# Patient Record
Sex: MALE | Employment: FULL TIME | URBAN - METROPOLITAN AREA
[De-identification: names, ages, dates, MRNs, and addresses within clinical notes are randomized per-mention and may not be internally consistent; named-entity substitution may affect disease eponyms.]

---

## 2017-03-15 PROBLEM — L20.9 ATOPIC DERMATITIS: Status: ACTIVE | Noted: 2017-03-15

## 2017-03-15 PROBLEM — B00.9 HERPES SIMPLEX TYPE 1 INFECTION: Status: ACTIVE | Noted: 2017-03-15

## 2017-03-15 PROBLEM — E78.5 HYPERLIPIDEMIA: Status: ACTIVE | Noted: 2017-03-15

## 2017-03-15 PROBLEM — L90.0 LICHEN SCLEROSUS ET ATROPHICUS: Status: ACTIVE | Noted: 2017-03-15

## 2017-03-23 PROBLEM — L72.3 SEBACEOUS CYST: Status: ACTIVE | Noted: 2017-03-23

## 2018-07-09 PROBLEM — Z80.42 FAMILY HISTORY OF PROSTATE CANCER IN FATHER: Status: ACTIVE | Noted: 2018-07-09

## 2018-07-20 ENCOUNTER — HOSPITAL ENCOUNTER (OUTPATIENT)
Age: 48
Discharge: HOME | End: 2018-07-22
Payer: COMMERCIAL

## 2018-07-20 ENCOUNTER — HOSPITAL ENCOUNTER (OUTPATIENT)
Dept: GENERAL RADIOLOGY | Age: 48
Discharge: HOME | End: 2018-07-22
Payer: COMMERCIAL

## 2018-07-20 DIAGNOSIS — R07.81 RIB PAIN ON RIGHT SIDE: ICD-10-CM

## 2018-07-20 PROCEDURE — 71101 X-RAY EXAM UNILAT RIBS/CHEST: CPT

## 2018-11-25 PROBLEM — Z28.21 REFUSED INFLUENZA VACCINE: Status: ACTIVE | Noted: 2018-11-25

## 2018-11-27 ENCOUNTER — HOSPITAL ENCOUNTER (OUTPATIENT)
Dept: GENERAL RADIOLOGY | Age: 48
Discharge: HOME | End: 2018-11-29
Payer: COMMERCIAL

## 2018-11-27 ENCOUNTER — HOSPITAL ENCOUNTER (OUTPATIENT)
Age: 48
Discharge: HOME | End: 2018-11-29
Payer: COMMERCIAL

## 2018-11-27 DIAGNOSIS — M25.572 ACUTE LEFT ANKLE PAIN: ICD-10-CM

## 2018-11-27 DIAGNOSIS — M79.672 FOOT PAIN, LEFT: ICD-10-CM

## 2018-11-27 PROCEDURE — 73630 X-RAY EXAM OF FOOT: CPT

## 2018-11-27 PROCEDURE — 73610 X-RAY EXAM OF ANKLE: CPT

## 2019-02-19 ENCOUNTER — OFFICE VISIT (OUTPATIENT)
Dept: PRIMARY CARE CLINIC | Age: 49
End: 2019-02-19
Payer: COMMERCIAL

## 2019-02-19 VITALS
SYSTOLIC BLOOD PRESSURE: 128 MMHG | TEMPERATURE: 98.2 F | DIASTOLIC BLOOD PRESSURE: 86 MMHG | BODY MASS INDEX: 42.09 KG/M2 | OXYGEN SATURATION: 98 % | HEART RATE: 84 BPM | WEIGHT: 285 LBS

## 2019-02-19 DIAGNOSIS — D18.01 CHERRY ANGIOMA: Primary | ICD-10-CM

## 2019-02-19 DIAGNOSIS — Z82.49 FAMILY HISTORY OF CEREBRAL ANEURYSM: ICD-10-CM

## 2019-02-19 DIAGNOSIS — D48.5 NEOPLASM OF UNCERTAIN BEHAVIOR OF SKIN: ICD-10-CM

## 2019-02-19 DIAGNOSIS — H92.02 EAR PAIN, LEFT: ICD-10-CM

## 2019-02-19 PROCEDURE — 99214 OFFICE O/P EST MOD 30 MIN: CPT | Performed by: PHYSICIAN ASSISTANT

## 2019-02-19 ASSESSMENT — PATIENT HEALTH QUESTIONNAIRE - PHQ9
SUM OF ALL RESPONSES TO PHQ9 QUESTIONS 1 & 2: 0
SUM OF ALL RESPONSES TO PHQ QUESTIONS 1-9: 0
1. LITTLE INTEREST OR PLEASURE IN DOING THINGS: 0
SUM OF ALL RESPONSES TO PHQ QUESTIONS 1-9: 0
2. FEELING DOWN, DEPRESSED OR HOPELESS: 0

## 2019-02-19 ASSESSMENT — ENCOUNTER SYMPTOMS: RESPIRATORY NEGATIVE: 1

## 2019-03-05 ENCOUNTER — TELEPHONE (OUTPATIENT)
Dept: PRIMARY CARE CLINIC | Age: 49
End: 2019-03-05

## 2019-03-15 ENCOUNTER — PROCEDURE VISIT (OUTPATIENT)
Dept: PRIMARY CARE CLINIC | Age: 49
End: 2019-03-15
Payer: COMMERCIAL

## 2019-03-15 VITALS
DIASTOLIC BLOOD PRESSURE: 80 MMHG | HEART RATE: 82 BPM | BODY MASS INDEX: 38.69 KG/M2 | WEIGHT: 262 LBS | OXYGEN SATURATION: 98 % | SYSTOLIC BLOOD PRESSURE: 122 MMHG

## 2019-03-15 DIAGNOSIS — D48.5 NEOPLASM OF UNCERTAIN BEHAVIOR OF SKIN: Primary | ICD-10-CM

## 2019-03-15 PROCEDURE — 11102 TANGNTL BX SKIN SINGLE LES: CPT | Performed by: PHYSICIAN ASSISTANT

## 2019-03-19 LAB — SURGICAL PATHOLOGY REPORT: NORMAL

## 2019-05-21 ENCOUNTER — OFFICE VISIT (OUTPATIENT)
Dept: PRIMARY CARE CLINIC | Age: 49
End: 2019-05-21
Payer: COMMERCIAL

## 2019-05-21 VITALS
BODY MASS INDEX: 41.32 KG/M2 | SYSTOLIC BLOOD PRESSURE: 118 MMHG | HEART RATE: 77 BPM | OXYGEN SATURATION: 98 % | DIASTOLIC BLOOD PRESSURE: 80 MMHG | WEIGHT: 279.8 LBS

## 2019-05-21 DIAGNOSIS — S86.112A GASTROCNEMIUS MUSCLE STRAIN, LEFT, INITIAL ENCOUNTER: ICD-10-CM

## 2019-05-21 PROCEDURE — 99213 OFFICE O/P EST LOW 20 MIN: CPT | Performed by: PHYSICIAN ASSISTANT

## 2019-05-21 ASSESSMENT — ENCOUNTER SYMPTOMS
BACK PAIN: 0
COLOR CHANGE: 0

## 2019-05-21 NOTE — PROGRESS NOTES
[facility]  [address] [facility]  Phone: [phone]  Fax: [phone]    Janeth Cutler is a 54 y.o. male who presents today for his medical conditions/complaintsas noted below. Chief Complaint   Patient presents with    Leg Pain     during sparring (martial arts) while trying to do a kick on Sunday, felt a pop in his left calf muscle. Now hurts with certain movements, walking is okay, not to painful. HPI:     HPI  Sparring at martial arts while doing a kick felt a pop in calf this past Sunday. Now in calf is swellling but no redness or warmth. Pushing off it. Motrin 800mg 1 po tid with food helps    Current Outpatient Medications   Medication Sig Dispense Refill    atorvastatin (LIPITOR) 10 MG tablet TAKE 1 TABLET BY MOUTH ONE TIME A DAY 30 tablet 11     No current facility-administered medications for this visit. No Known Allergies    Subjective:      Review of Systems   Musculoskeletal: Positive for gait problem and myalgias (left calf). Negative for arthralgias and back pain. Skin: Negative for color change, rash and wound. Objective:     /80   Pulse 77   Wt 279 lb 12.8 oz (126.9 kg)   SpO2 98%   BMI 41.32 kg/m²   Physical Exam   Musculoskeletal:        Left lower leg: He exhibits tenderness and swelling. He exhibits no bony tenderness, no edema, no deformity and no laceration. Legs:  Very tender here       Assessment:       Diagnosis Orders   1. Gastrocnemius muscle strain, left, initial encounter          Plan:    Continue Motrin 800mg 1 po tid with food  Heat and stretches  PT order given  D/W Dr. Moran High     Return if symptoms worsen or fail to improve. No orders of the defined types were placed in this encounter. No orders of the defined types were placed in this encounter.           Electronically signed by Kayleen Glaser 5/21/2019 at 1:55 PM

## 2019-07-18 ENCOUNTER — OFFICE VISIT (OUTPATIENT)
Dept: PRIMARY CARE CLINIC | Age: 49
End: 2019-07-18
Payer: COMMERCIAL

## 2019-07-18 VITALS
OXYGEN SATURATION: 97 % | SYSTOLIC BLOOD PRESSURE: 118 MMHG | HEART RATE: 66 BPM | WEIGHT: 277.6 LBS | BODY MASS INDEX: 40.99 KG/M2 | DIASTOLIC BLOOD PRESSURE: 86 MMHG

## 2019-07-18 DIAGNOSIS — M25.561 ACUTE PAIN OF RIGHT KNEE: ICD-10-CM

## 2019-07-18 PROCEDURE — 99213 OFFICE O/P EST LOW 20 MIN: CPT | Performed by: PHYSICIAN ASSISTANT

## 2019-09-19 ENCOUNTER — OFFICE VISIT (OUTPATIENT)
Dept: PRIMARY CARE CLINIC | Age: 49
End: 2019-09-19
Payer: COMMERCIAL

## 2019-09-19 VITALS
HEIGHT: 69 IN | DIASTOLIC BLOOD PRESSURE: 86 MMHG | BODY MASS INDEX: 41.35 KG/M2 | HEART RATE: 74 BPM | RESPIRATION RATE: 16 BRPM | SYSTOLIC BLOOD PRESSURE: 132 MMHG | WEIGHT: 279.2 LBS | OXYGEN SATURATION: 97 %

## 2019-09-19 DIAGNOSIS — S89.91XD INJURY OF RIGHT KNEE, SUBSEQUENT ENCOUNTER: Primary | ICD-10-CM

## 2019-09-19 DIAGNOSIS — M25.561 ACUTE PAIN OF RIGHT KNEE: ICD-10-CM

## 2019-09-19 PROCEDURE — 99213 OFFICE O/P EST LOW 20 MIN: CPT | Performed by: PHYSICIAN ASSISTANT

## 2019-09-19 ASSESSMENT — ENCOUNTER SYMPTOMS: COLOR CHANGE: 0

## 2019-09-19 NOTE — PROGRESS NOTES
 Diabetes screen  02/16/2012    Flu vaccine (1) 09/01/2019    Lipid screen  07/03/2023    DTaP/Tdap/Td vaccine (3 - Td) 03/23/2027    Pneumococcal 0-64 years Vaccine  Aged Out       Subjective:      Review of Systems   Musculoskeletal: Positive for arthralgias (knee), gait problem (when he moves certain ways he can't hiold his weight) and joint swelling. Negative for myalgias. Skin: Negative for color change, rash and wound. Objective:     /86   Pulse 74   Resp 16   Ht 5' 9\" (1.753 m)   Wt 279 lb 3.2 oz (126.6 kg)   SpO2 97%   BMI 41.23 kg/m²   Physical Exam   Musculoskeletal:        Right knee: He exhibits decreased range of motion (pain in flexion ), swelling (mild) and abnormal meniscus. He exhibits no effusion, no ecchymosis, no deformity, no laceration, no erythema, no LCL laxity, normal patellar mobility, no bony tenderness and no MCL laxity. Tenderness found. Medial joint line tenderness noted. Assessment:       Diagnosis Orders   1. Injury of right knee, subsequent encounter  MRI KNEE RIGHT WO CONTRAST   2. Acute pain of right knee          Plan:    Have MRI of knee  Continue ibuprofen   RICE principles. Return if symptoms worsen or fail to improve. Orders Placed This Encounter   Procedures    MRI KNEE RIGHT WO CONTRAST     Standing Status:   Future     Standing Expiration Date:   9/19/2020     No orders of the defined types were placed in this encounter. Patient given educationalmaterials - see patient instructions. Discussed use, benefit, and side effectsof prescribed medications. All patient questions answered. Pt voiced understanding. Reviewed health maintenance. Instructed to continue current medications, diet andexercise. Patient agreed with treatment plan. Follow up as directed.      Electronicallysigned by Delaney Christian PA-C on 9/19/2019 at 1:49 PM

## 2019-09-24 DIAGNOSIS — E78.5 HYPERLIPIDEMIA, UNSPECIFIED HYPERLIPIDEMIA TYPE: Primary | ICD-10-CM

## 2019-10-07 DIAGNOSIS — S89.91XD INJURY OF RIGHT KNEE, SUBSEQUENT ENCOUNTER: ICD-10-CM

## 2019-10-08 ENCOUNTER — TELEPHONE (OUTPATIENT)
Dept: PRIMARY CARE CLINIC | Age: 49
End: 2019-10-08

## 2019-10-08 DIAGNOSIS — M25.561 ACUTE PAIN OF RIGHT KNEE: ICD-10-CM

## 2019-10-08 DIAGNOSIS — M76.50 PATELLAR TENDINOSIS: Primary | ICD-10-CM

## 2019-10-10 ENCOUNTER — OFFICE VISIT (OUTPATIENT)
Dept: ORTHOPEDIC SURGERY | Age: 49
End: 2019-10-10
Payer: COMMERCIAL

## 2019-10-10 VITALS
DIASTOLIC BLOOD PRESSURE: 76 MMHG | HEIGHT: 70 IN | HEART RATE: 70 BPM | WEIGHT: 274 LBS | SYSTOLIC BLOOD PRESSURE: 118 MMHG | BODY MASS INDEX: 39.22 KG/M2

## 2019-10-10 DIAGNOSIS — S83.241A ACUTE MEDIAL MENISCUS TEAR OF RIGHT KNEE, INITIAL ENCOUNTER: Primary | ICD-10-CM

## 2019-10-10 PROCEDURE — 20611 DRAIN/INJ JOINT/BURSA W/US: CPT | Performed by: ORTHOPAEDIC SURGERY

## 2019-10-10 PROCEDURE — 99203 OFFICE O/P NEW LOW 30 MIN: CPT | Performed by: ORTHOPAEDIC SURGERY

## 2019-10-10 RX ORDER — LIDOCAINE HYDROCHLORIDE 10 MG/ML
4 INJECTION, SOLUTION INFILTRATION; PERINEURAL ONCE
Status: COMPLETED | OUTPATIENT
Start: 2019-10-10 | End: 2019-10-11

## 2019-10-10 RX ORDER — IBUPROFEN 200 MG
200 TABLET ORAL EVERY 6 HOURS PRN
COMMUNITY

## 2019-10-10 RX ORDER — METHYLPREDNISOLONE ACETATE 40 MG/ML
40 INJECTION, SUSPENSION INTRA-ARTICULAR; INTRALESIONAL; INTRAMUSCULAR; SOFT TISSUE ONCE
Status: COMPLETED | OUTPATIENT
Start: 2019-10-10 | End: 2019-10-11

## 2019-10-11 RX ADMIN — LIDOCAINE HYDROCHLORIDE 4 ML: 10 INJECTION, SOLUTION INFILTRATION; PERINEURAL at 09:16

## 2019-10-11 RX ADMIN — METHYLPREDNISOLONE ACETATE 40 MG: 40 INJECTION, SUSPENSION INTRA-ARTICULAR; INTRALESIONAL; INTRAMUSCULAR; SOFT TISSUE at 09:17

## 2019-11-21 ENCOUNTER — OFFICE VISIT (OUTPATIENT)
Dept: ORTHOPEDIC SURGERY | Age: 49
End: 2019-11-21
Payer: COMMERCIAL

## 2019-11-21 VITALS
HEART RATE: 84 BPM | WEIGHT: 274 LBS | BODY MASS INDEX: 40.58 KG/M2 | DIASTOLIC BLOOD PRESSURE: 86 MMHG | HEIGHT: 69 IN | SYSTOLIC BLOOD PRESSURE: 153 MMHG

## 2019-11-21 DIAGNOSIS — S83.241D ACUTE MEDIAL MENISCUS TEAR OF RIGHT KNEE, SUBSEQUENT ENCOUNTER: Primary | ICD-10-CM

## 2019-11-21 PROCEDURE — 99213 OFFICE O/P EST LOW 20 MIN: CPT | Performed by: ORTHOPAEDIC SURGERY

## 2019-11-21 ASSESSMENT — ENCOUNTER SYMPTOMS
VOMITING: 0
ABDOMINAL DISTENTION: 0
COLOR CHANGE: 0
ABDOMINAL PAIN: 0
CHEST TIGHTNESS: 0
CONSTIPATION: 0
APNEA: 0
DIARRHEA: 0
COUGH: 0
SHORTNESS OF BREATH: 0
NAUSEA: 0

## 2019-12-10 ENCOUNTER — OFFICE VISIT (OUTPATIENT)
Dept: PRIMARY CARE CLINIC | Age: 49
End: 2019-12-10
Payer: COMMERCIAL

## 2019-12-10 VITALS
WEIGHT: 293 LBS | OXYGEN SATURATION: 98 % | SYSTOLIC BLOOD PRESSURE: 138 MMHG | HEART RATE: 89 BPM | HEIGHT: 69 IN | BODY MASS INDEX: 43.4 KG/M2 | DIASTOLIC BLOOD PRESSURE: 82 MMHG

## 2019-12-10 DIAGNOSIS — S69.92XA HAND INJURY, LEFT, INITIAL ENCOUNTER: Primary | ICD-10-CM

## 2019-12-10 DIAGNOSIS — Z13.1 SCREENING FOR DIABETES MELLITUS: ICD-10-CM

## 2019-12-10 PROCEDURE — 99213 OFFICE O/P EST LOW 20 MIN: CPT | Performed by: PHYSICIAN ASSISTANT

## 2019-12-11 ENCOUNTER — TELEPHONE (OUTPATIENT)
Dept: PRIMARY CARE CLINIC | Age: 49
End: 2019-12-11

## 2019-12-11 ENCOUNTER — HOSPITAL ENCOUNTER (OUTPATIENT)
Dept: GENERAL RADIOLOGY | Age: 49
Discharge: HOME | End: 2019-12-13
Payer: COMMERCIAL

## 2019-12-11 ENCOUNTER — HOSPITAL ENCOUNTER (OUTPATIENT)
Age: 49
Discharge: HOME | End: 2019-12-13
Payer: COMMERCIAL

## 2019-12-11 DIAGNOSIS — S69.92XA HAND INJURY, LEFT, INITIAL ENCOUNTER: ICD-10-CM

## 2019-12-11 PROCEDURE — 73130 X-RAY EXAM OF HAND: CPT

## 2019-12-23 ENCOUNTER — TELEPHONE (OUTPATIENT)
Dept: ORTHOPEDIC SURGERY | Age: 49
End: 2019-12-23

## 2019-12-27 ENCOUNTER — HOSPITAL ENCOUNTER (OUTPATIENT)
Age: 49
Setting detail: SPECIMEN
Discharge: HOME | End: 2019-12-27
Payer: COMMERCIAL

## 2019-12-27 DIAGNOSIS — Z13.1 SCREENING FOR DIABETES MELLITUS: ICD-10-CM

## 2019-12-27 DIAGNOSIS — E78.5 HYPERLIPIDEMIA, UNSPECIFIED HYPERLIPIDEMIA TYPE: ICD-10-CM

## 2019-12-27 LAB
ALT SERPL-CCNC: 32 U/L (ref 5–41)
ANION GAP SERPL CALCULATED.3IONS-SCNC: 12 MMOL/L (ref 9–17)
AST SERPL-CCNC: 21 U/L
BUN BLDV-MCNC: 16 MG/DL (ref 6–20)
BUN/CREAT BLD: ABNORMAL (ref 9–20)
CALCIUM SERPL-MCNC: 9.2 MG/DL (ref 8.6–10.4)
CHLORIDE BLD-SCNC: 102 MMOL/L (ref 98–107)
CHOLESTEROL/HDL RATIO: 4.2
CHOLESTEROL: 193 MG/DL
CO2: 28 MMOL/L (ref 20–31)
CREAT SERPL-MCNC: 1 MG/DL (ref 0.7–1.2)
GFR AFRICAN AMERICAN: >60 ML/MIN
GFR NON-AFRICAN AMERICAN: >60 ML/MIN
GFR SERPL CREATININE-BSD FRML MDRD: ABNORMAL ML/MIN/{1.73_M2}
GFR SERPL CREATININE-BSD FRML MDRD: ABNORMAL ML/MIN/{1.73_M2}
GLUCOSE FASTING: 109 MG/DL (ref 70–99)
HDLC SERPL-MCNC: 46 MG/DL
LDL CHOLESTEROL: 120 MG/DL (ref 0–130)
POTASSIUM SERPL-SCNC: 4.3 MMOL/L (ref 3.7–5.3)
SODIUM BLD-SCNC: 142 MMOL/L (ref 135–144)
TRIGL SERPL-MCNC: 134 MG/DL
VLDLC SERPL CALC-MCNC: NORMAL MG/DL (ref 1–30)

## 2020-01-13 ENCOUNTER — OFFICE VISIT (OUTPATIENT)
Dept: ORTHOPEDIC SURGERY | Age: 50
End: 2020-01-13
Payer: COMMERCIAL

## 2020-01-13 ENCOUNTER — HOSPITAL ENCOUNTER (OUTPATIENT)
Age: 50
Setting detail: SPECIMEN
Discharge: HOME | End: 2020-01-13
Payer: COMMERCIAL

## 2020-01-13 VITALS
BODY MASS INDEX: 41.09 KG/M2 | WEIGHT: 287 LBS | HEART RATE: 63 BPM | HEIGHT: 70 IN | SYSTOLIC BLOOD PRESSURE: 150 MMHG | DIASTOLIC BLOOD PRESSURE: 98 MMHG

## 2020-01-13 LAB
CULTURE: NORMAL
DIRECT EXAM: NORMAL
Lab: NORMAL
SPECIMEN DESCRIPTION: NORMAL

## 2020-01-13 PROCEDURE — 99213 OFFICE O/P EST LOW 20 MIN: CPT | Performed by: ORTHOPAEDIC SURGERY

## 2020-01-13 PROCEDURE — 20611 DRAIN/INJ JOINT/BURSA W/US: CPT | Performed by: ORTHOPAEDIC SURGERY

## 2020-01-13 ASSESSMENT — ENCOUNTER SYMPTOMS
COUGH: 0
APNEA: 0
CHEST TIGHTNESS: 0
VOMITING: 0
CONSTIPATION: 0
ABDOMINAL PAIN: 0
COLOR CHANGE: 0
DIARRHEA: 0
NAUSEA: 0
SHORTNESS OF BREATH: 0
ABDOMINAL DISTENTION: 0

## 2020-01-13 NOTE — PROGRESS NOTES
[address] [facility]  [address] [place] [id number]  Dept: [phone]  Dept Fax: [phone]          Right Knee - Follow Up     Subjective:     Chief Complaint   Patient presents with    Knee Pain     Patient is here today to follow up on his right knee pain. He states that he bowled last night, and was sore, but this morning he is not in any pain. HPI:     Rogelio Islas presents today for Right knee pain. The pain has been present for 6 months. The patient recalls a specific injury where the knee got twisted in June 2019 and he has had pain since. Patient states that his knee was feeling good but a few weeks ago the knee pain became unbearable and he was unable to walk. He states that the pain became so bad that his boss asked him when he was going to get the knee fixed. That is what made him setup an appointment. The patient has tried rest, heat, stretching and ice with mild improvement. The pain is now described as Achy. There is pain on weight bearing. The knee has not swelled. There is painful popping and clicking. The knee has not caught or locked up. The knee has not given out. It is not stiff upon arising from sitting. It is not painful to go up and down stairs and sit for a prolonged time. The patient has had a cortisone injection on 10/10/2019 with good pain relief. The patient has not tried a lubrication injection. The patient has not tried physical therapy but he does home exercises. The patient has not had surgery. The opposite knee is okay. Patient states that he went bowling last night and his knee was a little sore but the pain is bearable and it is not as bad as it was weeks ago. Review of Systems   Constitutional: Positive for activity change. Negative for appetite change. Respiratory: Negative for apnea, cough, chest tightness and shortness of breath.     Cardiovascular: Negative for chest pain, Attends Worship service: None     Active member of club or organization: None     Attends meetings of clubs or organizations: None     Relationship status: None    Intimate partner violence:     Fear of current or ex partner: None     Emotionally abused: None     Physically abused: None     Forced sexual activity: None   Other Topics Concern    None   Social History Narrative    None     Family History:  Family History   Problem Relation Age of Onset    Other Mother         cerebral aneurysm    Prostate Cancer Father     Kidney Disease Father     Coronary Art Dis Father     Breast Cancer Paternal Grandmother     Other Paternal Grandfather         ALS    Diabetes Sister         Type 2     Vitals:   BP (!) 150/98   Pulse 63   Ht 5' 10\" (1.778 m)   Wt 287 lb (130.2 kg)   BMI 41.18 kg/m²  Body mass index is 41.18 kg/m². Physical Examination:     Orthopedics:    GENERAL: Alert and oriented X3 in no acute distress. SKIN: Intact without lesions or ulcerations. NEURO: Musculoskeletal and axillary nerves intact to sensory and motor testing. VASC: Capillary refill is less than 3 seconds. KNEE EXAM    LOCATION: Right Knee  GEN: Alert and oriented X 3, in no acute distress. GAIT: The patient's gait was observed while entering the exam room and was noted to be non antalgic. The extremity is in slight varus alignment. SKIN: Intact without rashes, lesions, or ulcerations. No obvious deformity or swelling. NEURO: The patient responds to light touch throughout right LE. Patellar and Achilles reflexes are 2/4. VASC: The right LE is neurovascularly intact with 2/4 DP and 2/4 PT pulses. Brisk capillary refill. ROM: 0/132 degrees. There is mild effusion. Pain with forced extension. MUSC: Good quad tone  LIGAMENT: Lachman's test is Negative with Good endpoint. Anterior drawer Negative. Posterior drawer Negative. There is  No varus instability at 0 degrees and No varus instability at 30 degrees.  There is No valgus instability at 0 degrees and No valgus instability at 30 degrees. SPECIAL: Christiano test is positive with no clunks and no crepitation but there is mild pain. PALP: There is medial joint line pain. Assessment:     1. Acute medial meniscus tear of right knee, initial encounter      Procedures:    Procedure: yes    Arthrocentesis    Location: Right Knee  Procedure: After consent was obtained, using sterile technique the right knee was prepped and plain Lidocaine 1% was used as local anesthetic. The joint was entered and 17 cc's of yellow colored fluid was withdrawn and sent to check for gout. The procedure was well tolerated. The patient is asked to continue to rest the joint for a few more days before resuming regular activities. It may be more painful for the first 1-2 days. Watch for fever, or increased swelling or persistent pain in the joint. Call or return to clinic prn if such symptoms occur or there is failure to improve as anticipated. --------------------------------------------------------------------------------------------------------------------  Regular Knee Injection    Location: Right Knee  Procedure: Corticosteroid injection into the knee. The patient was placed in the Supine position on the exam table. The superior lateral portal was identified and marked. The skin was prepped with betadine in a sterile fashion. Utilizing ultrasound for precise placement and clean technique with sterile gloves a 5cc solution containing 4cc of 1.0% Lidocaine with 1cc containing 40mg of Depo-medrol  was injected. There was no resistance to the injection. The wound was cleansed and a band-aid was placed. the patient tolerated the procedure without difficulty. Adverse reactions to the injection were discussed with the patient including signs of infection (increasing pain, redness, swelling) and the patient was instructed to call immediately if experiencing any of these symptoms.    Radiology:   X-ray was reviewed from 11/21/2019. MRI was reviewed from 10/02/2019. Plan:   Treatment : I reviewed the X-ray and MRI with the patient and I informed him that I do not see any severe osteoarthritis or any fractures. We discussed the etiologies and natural histories of Effusion and Tear of the medial meniscus in the in the right knee. We discussed the various treatment alternatives including anti-inflammatory medications, physical therapy, injections, further imaging studies and as a last result surgery. During today's visit, I explained to the patient that there is nothing that I can see on the radiographs or the MRI that is causing his pain. I then informed him that we can try aspirating the knee and sending it to a lab to check for gout and then we can inject the knee with cortisone today to help him get good pain relief. But overall I do not feel he needs a surgery and I made the patient aware of that. I then asked the patient if he would like to have the knee aspirate to check for gout to see if the gout is what is causing his pain and he stated that he would like to try it today. The patient has opted for arthrocentesis, where we removed 17 cc's of yellow tinged synovial fluid from the right knee joint and the fluid was sent to check for gout. Patient also opted for a cortisone injection into the right knee to help reduce inflammation and pain. The injection site should never get red, hot, or swollen and if it does the patient will contact our office right away. The patient may experience a increase in soreness the first 24-48 hours due to a cortisone flair and can take anti-inflammatories for a short period of time to reduce that soreness. The patient should not submerge the injection site in water for a minimum of 24 hours to avoid infection. This means no lakes, pools, ponds, or hot tubs for 24 hours. If the patient is diabetic the injection may increase their blood sugar for up to one week.  The patient can do this cortisone injection once every 3 months as needed. If the injections stop working and do not give the patient relief the patient should consider surgical interventions to produce long term relief. Then if the cortisone injection and aspiration does not help for the pain, I informed him that we can try physical therapy, we can try a medial  brace and we can also have him try NSAID's at a prescription dose to help reduce his pain. Patient states that he understands. Patient should return to the clinic in 6 weeks to follow up with Carli SANCHEZ and at that visit we will refer if he has gout based on his lab results. The patient will call the office immediately with any problems. Orders Placed This Encounter   Medications    lidocaine 1 % injection 4 mL    methylPREDNISolone acetate (DEPO-MEDROL) injection 40 mg     Orders Placed This Encounter   Procedures    Anaerobic and Aerobic Culture     MUST HOLD CULTURES FOR 14 DAYS (even if no growth) right knee     Standing Status:   Future     Standing Expiration Date:   1/13/2021    Gram Stain     Right knee     Standing Status:   Future     Standing Expiration Date:   1/13/2021     Order Specific Question:   Specify Specimen Type     Answer:   Fluid    Body Fluid Cell Count with Differential     Right knee     Standing Status:   Future     Standing Expiration Date:   1/13/2021     Order Specific Question:   Specify Specimen Type     Answer:   Fluid    Body Fluid Crystal     Right knee     Standing Status:   Future     Standing Expiration Date:   1/13/2021     Adrian Mendez V am scribing for and in the presence of Carli Jaramillo D.O. 1/18/2020  9:03 PM        I, Carli Jaramillo DO, have personally seen this patient and I have reviewed the CC, PMH, FHX and Social History as provided by other clinical staff. I reassessed the HPI and ROS as scribed by Elidia Chance in my presence and it is both accurate and complete.  Thereafter, I personally performed

## 2020-01-14 LAB
APPEARANCE FLUID: NORMAL
BASO FLUID: NORMAL %
COLOR FLUID: NORMAL
CRYSTALS, FLUID: NEGATIVE
EOSINOPHIL FLUID: NORMAL %
FLUID DIFF COMMENT: NORMAL
LYMPHOCYTES, BODY FLUID: 86 %
MONOCYTE, FLUID: NORMAL %
NEUTROPHIL, FLUID: 7 %
OTHER CELLS FLUID: NORMAL %
RBC FLUID: NORMAL /MM3
SPECIMEN TYPE: NORMAL
SPECIMEN TYPE: NORMAL
WBC FLUID: 375 /MM3

## 2020-01-15 RX ORDER — LIDOCAINE HYDROCHLORIDE 10 MG/ML
4 INJECTION, SOLUTION INFILTRATION; PERINEURAL ONCE
Status: COMPLETED | OUTPATIENT
Start: 2020-01-15 | End: 2020-01-17

## 2020-01-15 RX ORDER — METHYLPREDNISOLONE ACETATE 40 MG/ML
40 INJECTION, SUSPENSION INTRA-ARTICULAR; INTRALESIONAL; INTRAMUSCULAR; SOFT TISSUE ONCE
Status: COMPLETED | OUTPATIENT
Start: 2020-01-15 | End: 2020-01-17

## 2020-01-17 RX ADMIN — METHYLPREDNISOLONE ACETATE 40 MG: 40 INJECTION, SUSPENSION INTRA-ARTICULAR; INTRALESIONAL; INTRAMUSCULAR; SOFT TISSUE at 13:04

## 2020-01-17 RX ADMIN — LIDOCAINE HYDROCHLORIDE 4 ML: 10 INJECTION, SOLUTION INFILTRATION; PERINEURAL at 13:02

## 2020-01-19 LAB
CULTURE: ABNORMAL
DIRECT EXAM: ABNORMAL
Lab: ABNORMAL
SPECIMEN DESCRIPTION: ABNORMAL

## 2020-02-27 ENCOUNTER — OFFICE VISIT (OUTPATIENT)
Dept: ORTHOPEDIC SURGERY | Age: 50
End: 2020-02-27
Payer: COMMERCIAL

## 2020-02-27 VITALS — BODY MASS INDEX: 41.09 KG/M2 | HEIGHT: 70 IN | WEIGHT: 287.04 LBS

## 2020-02-27 PROCEDURE — 99213 OFFICE O/P EST LOW 20 MIN: CPT | Performed by: ORTHOPAEDIC SURGERY

## 2020-02-27 ASSESSMENT — ENCOUNTER SYMPTOMS
ABDOMINAL DISTENTION: 0
CONSTIPATION: 0
CHEST TIGHTNESS: 0
APNEA: 0
NAUSEA: 0
VOMITING: 0
COUGH: 0
COLOR CHANGE: 0
SHORTNESS OF BREATH: 0
ABDOMINAL PAIN: 0
DIARRHEA: 0

## 2020-02-27 NOTE — PROGRESS NOTES
[address] [facility]  [address] [place] [address]  Dept: [phone]  Dept Fax: [phone]          Right Knee - Follow Up     Subjective:     Chief Complaint   Patient presents with    Knee Pain     RT Knee pain,cortisone inj 01/13     HPI:     Leslee Malin presents today for Right knee pain. The pain has been present for 7 months. The patient recalls a specific injury where the knee got twisted in June 2019 and he has had pain since. The patient has tried Ibuprofen, rest, heat, stretching and ice with moderate improvement. The pain is now described as Stabbing , Achy, Sharp and Dull based on what he is doing. There is pain on weight bearing. The knee has swelled. There is not painful popping and clicking. The knee has not caught or locked up. The knee has not given out. It is not stiff upon arising from sitting. It is painful to go up and down stairs and sit for a prolonged time. The patient has had a cortisone injection and arthrocentesis on 01/13/2020 and he states that he had good pain relief. The patient has not tried a lubrication injection. The patient has not tried physical therapy but he does home exercises. The patient has not had surgery. The opposite knee is okay. Patient states that he has stopped doing martial arts due to his pain but he states the knee has gotten better recently. ROS:   Review of Systems   Constitutional: Positive for activity change. Negative for appetite change. Respiratory: Negative for apnea, cough, chest tightness and shortness of breath. Cardiovascular: Negative for chest pain, palpitations and leg swelling. Gastrointestinal: Negative for abdominal distention, abdominal pain, constipation, diarrhea, nausea and vomiting. Genitourinary: Negative for difficulty urinating, dysuria and hematuria. Musculoskeletal: Positive for arthralgias.  Negative for gait problem, joint swelling and myalgias. Skin: Negative for color change and rash. Neurological: Negative for dizziness, weakness, numbness and headaches. Psychiatric/Behavioral: Negative for sleep disturbance. Past Medical History:    Past Medical History:   Diagnosis Date    Allergic rhinitis     Sciatica     Varicocele      Past Surgical History:    Past Surgical History:   Procedure Laterality Date    WISDOM TOOTH EXTRACTION       Current Medications:   Current Outpatient Medications   Medication Sig Dispense Refill    ibuprofen (ADVIL;MOTRIN) 200 MG tablet Take 200 mg by mouth every 6 hours as needed for Pain      atorvastatin (LIPITOR) 10 MG tablet TAKE 1 TABLET BY MOUTH ONE TIME A DAY  90 tablet 3     No current facility-administered medications for this visit. Allergies:    Patient has no known allergies.     Social History:   Social History     Socioeconomic History    Marital status:      Spouse name: None    Number of children: None    Years of education: None    Highest education level: None   Occupational History    None   Social Needs    Financial resource strain: None    Food insecurity:     Worry: None     Inability: None    Transportation needs:     Medical: None     Non-medical: None   Tobacco Use    Smoking status: Never Smoker    Smokeless tobacco: Never Used   Substance and Sexual Activity    Alcohol use: Yes     Comment: \"once in a blue moon\"    Drug use: No    Sexual activity: None   Lifestyle    Physical activity:     Days per week: None     Minutes per session: None    Stress: None   Relationships    Social connections:     Talks on phone: None     Gets together: None     Attends Jain service: None     Active member of club or organization: None     Attends meetings of clubs or organizations: None     Relationship status: None    Intimate partner violence:     Fear of current or ex partner: None     Emotionally abused: None     Physically abused: None     Forced sexual activity: None   Other Topics Concern    None   Social History Narrative    None     Family History:  Family History   Problem Relation Age of Onset    Other Mother         cerebral aneurysm    Prostate Cancer Father     Kidney Disease Father     Coronary Art Dis Father     Breast Cancer Paternal Grandmother     Other Paternal Grandfather         ALS    Diabetes Sister         Type 2     Vitals:   Ht 5' 10\" (1.778 m)   Wt 287 lb 0.6 oz (130.2 kg)   BMI 41.19 kg/m²  Body mass index is 41.19 kg/m². Physical Examination:     Orthopedics:    GENERAL: Alert and oriented X3 in no acute distress. SKIN: Intact without lesions or ulcerations. NEURO: Musculoskeletal and axillary nerves intact to sensory and motor testing. VASC: Capillary refill is less than 3 seconds. KNEE EXAM    LOCATION: Right Knee  GEN: Alert and oriented X 3, in no acute distress. GAIT: The patient's gait was observed while entering the exam room and was noted to be non  antalgic. The extremity is in anatomic alignment. SKIN: Intact without rashes, lesions, or ulcerations. No obvious deformity or swelling. NEURO: The patient responds to light touch throughout right LE. Patellar and Achilles reflexes are 2/4. VASC: The right LE is neurovascularly intact with 2/4 DP and 2/4 PT pulses. Brisk capillary refill. ROM: 0/134 degrees. There is mild effusion. No pain with forced extension or flexion. MUSC: good quad tone  LIGAMENT: Lachman's test is Negative with Good endpoint. Anterior drawer Negative. Posterior drawer Negative. There is  No varus instability at 0 degrees and No varus instability at 30 degrees. There is No valgus instability at 0 degrees and No valgus instability at 30 degrees. SPECIAL: Christiano test is negative with no clunks, no crepitation, and no pain. PALP: There is medial joint line pain and medial tibial plateau pain. Assessment:     1. Acute medial meniscus tear of right knee, subsequent encounter    2. Effusion of bursa of right knee    3. Primary osteoarthritis of right knee      Procedures:    Procedure: no  Radiology:   X-ray was reviewed from 11/21/2019. Mri was reviewed from 10/02/2019. Plan:   Treatment : I reviewed the X-ray and MRI with the patient. We discussed the etiologies and natural histories of Effusion, tear of the medial meniscus and primary osteoarthritis of the right knee. We discussed the various treatment alternatives including anti-inflammatory medications, physical therapy, injections, further imaging studies and as a last result surgery. During today's visit, I explained and instructed the patient to start slow when he is trying to get back into doing the things he likes to do. From there, I informed him that we can ask his insurance for a lubricating injection to treat his arthritis that he has in his knee. I also told him that we can try a brace to help take pressure off of his knee. Lastly, I informed him that he may live with the pain but I do not feel that it is the best option for him. So I asked the patient what he would like to do and he stated that he would like to try the lubricating injection and a brace to help his knee get good pain relief. I also told him that he should try using an exercise bike at a local gym to help him strengthen his knee and to help reduce some of the pain that he is having. So at this time, the patient has opted for a medial  brace and we will also ask his insurance to approve a lubricating injection. A medial  brace was ordered for the patient to provide pain control and good joint stabilization of the right knee. The brace will improve the ADL's for the patient so they may have a better quality of life and it will reduce the pain they are experiencing. Patient was also instructed to contact the office in 2 weeks if he has not heard from the office about his lubricating injection being approved.  Patient should return to the clinic in 4 weeks to follow up with Jackson Meza PA-C, [facility] for a lubricating injection once it is approved. The patient will call the office immediately with any problems. No orders of the defined types were placed in this encounter. Orders Placed This Encounter   Procedures    DME Order for (Specify) as OP     - DME device ordered - medial  brace for right knee  - Diagnosis: right knee arthritis  - Length of Need: 3 years      Ambar MOBLEY Day V, am scribing for and in the presence of Tatiana Bolaños D.O. 2/29/2020  11:02 PM        ITatiana DO, have personally seen this patient and I have reviewed the CC, PMH, FHX and Social History as provided by other clinical staff. I reassessed the HPI and ROS as scribed by Richardson Cooks in my presence and it is both accurate and complete. Thereafter, I personally performed the PE, reviewed the imaging and established the DX and POC. I agree with the documentation provided by the Medical Scribe. I have reviewed all documentation in its entirety prior to providing my signature indicating agreement. Any areas of disagreement are noted on the chart.     Electronically signed by Coty Kelly DO on 2/29/2020 at 11:02 PM          Electronically signed by Coty Kelly DO, on 2/29/2020 at 11:02 PM

## 2020-03-05 ENCOUNTER — TELEPHONE (OUTPATIENT)
Dept: ORTHOPEDIC SURGERY | Age: 50
End: 2020-03-05

## 2020-03-05 NOTE — TELEPHONE ENCOUNTER
Spoke with Asuncion Sandoval at [facility], No PA needed for Synvics one inj.  Patient will call when he decides if he wants inj

## 2020-05-29 ENCOUNTER — OFFICE VISIT (OUTPATIENT)
Dept: ORTHOPEDIC SURGERY | Age: 50
End: 2020-05-29
Payer: COMMERCIAL

## 2020-05-29 VITALS — HEIGHT: 70 IN | BODY MASS INDEX: 41.09 KG/M2 | WEIGHT: 287.04 LBS | TEMPERATURE: 97.7 F

## 2020-05-29 PROCEDURE — 20611 DRAIN/INJ JOINT/BURSA W/US: CPT | Performed by: PHYSICIAN ASSISTANT

## 2020-05-29 PROCEDURE — 99213 OFFICE O/P EST LOW 20 MIN: CPT | Performed by: PHYSICIAN ASSISTANT

## 2020-05-29 RX ORDER — LIDOCAINE HYDROCHLORIDE 10 MG/ML
4 INJECTION, SOLUTION INFILTRATION; PERINEURAL ONCE
Status: COMPLETED | OUTPATIENT
Start: 2020-05-29 | End: 2020-05-29

## 2020-05-29 RX ORDER — METHYLPREDNISOLONE ACETATE 40 MG/ML
40 INJECTION, SUSPENSION INTRA-ARTICULAR; INTRALESIONAL; INTRAMUSCULAR; SOFT TISSUE ONCE
Status: COMPLETED | OUTPATIENT
Start: 2020-05-29 | End: 2020-05-29

## 2020-05-29 RX ADMIN — METHYLPREDNISOLONE ACETATE 40 MG: 40 INJECTION, SUSPENSION INTRA-ARTICULAR; INTRALESIONAL; INTRAMUSCULAR; SOFT TISSUE at 13:11

## 2020-05-29 RX ADMIN — LIDOCAINE HYDROCHLORIDE 4 ML: 10 INJECTION, SOLUTION INFILTRATION; PERINEURAL at 13:11

## 2020-05-29 ASSESSMENT — ENCOUNTER SYMPTOMS
CHEST TIGHTNESS: 0
CONSTIPATION: 0
DIARRHEA: 0
ABDOMINAL DISTENTION: 0
SHORTNESS OF BREATH: 0
VOMITING: 0
COLOR CHANGE: 0
APNEA: 0
COUGH: 0
NAUSEA: 0
ABDOMINAL PAIN: 0

## 2020-05-29 NOTE — PROGRESS NOTES
instability at 30 degrees. SPECIAL: Christiano test is negative with no clunks, no crepitation, and no pain. PALP: There is medial joint line pain. Assessment:     1. Primary osteoarthritis of right knee    2. Plantar fasciitis of left foot      Procedures:    Procedure: yes    Arthrocentesis    Location: Right Knee  Procedure: After consent was obtained, using sterile technique the right kneee was prepped and plain Lidocaine 1% was used as local anesthetic. The joint was entered and 20 cc's of yellow colored fluid was withdrawn. 1 cc of 40 mg Depo-medrol and 4 cc's of plain Lidocaine was then injected and the needle withdrawn. The procedure was well tolerated. The patient is asked to continue to rest the joint for a few more days before resuming regular activities. It may be more painful for the first 1-2 days. Watch for fever, or increased swelling or persistent pain in the joint. Call or return to clinic prn if such symptoms occur or there is failure to improve as anticipated. --------------------------------------------------------------------------------------------------------------------  Regular Knee Injection    Location: Right Knee  Procedure: Corticosteroid injection into the knee. The patient was placed in the Supine position on the exam table. The superior lateral portal was identified and marked. The skin was prepped with betadine in a sterile fashion. Utilizing ultrasound for precise placement and clean technique with sterile gloves a 5 cc solution containing 4cc of 1.0% Lidocaine with 1cc containing 40mg of Depo-medrol  was injected. There was no resistance to the injection. The wound was cleansed and a band-aid was placed. the patient tolerated the procedure without difficulty. Adverse reactions to the injection were discussed with the patient including signs of infection (increasing pain, redness, swelling) and the patient was instructed to call immediately if experiencing any of these symptoms. lakes, pools, ponds, or hot tubs for 24 hours. If the patient is diabetic the injection may increase their blood sugar for up to one week. The patient can do this cortisone injection once every 3 months as needed. If the injections stop working and do not give the patient relief the patient should consider surgical interventions to produce long term relief. Patient should return to the clinic in 4 weeks to follow up with Steven Lewis PA-C, ATC. The patient will call the office immediately with any problems. Orders Placed This Encounter   Medications    lidocaine 1 % injection 4 mL    methylPREDNISolone acetate (DEPO-MEDROL) injection 40 mg     Orders Placed This Encounter   Procedures    IL ARTHROCENTESIS ASPIR&/INJ MAJOR JT/BURSA W/O US     I, Swapnil Day V, am scribing for and in the presence of Quinn Paul ATC 5/29/2020  2:02 PM      I, Steven Lewis PA-C, ATC,  have personally seen this patient, reviewed the chart including history, and imaging if done. I personally  performed the physical exam and obtained any needed additional history. I placed orders, performed or supervised procedures and developed the treatment plan.     Electronically signed by Maria Garner PA-C, on 5/29/2020 at 2:02 PM      Electronically signed by Maria Garner PA-C, on 5/29/2020 at 2:02 PM

## 2020-06-26 ENCOUNTER — OFFICE VISIT (OUTPATIENT)
Dept: ORTHOPEDIC SURGERY | Age: 50
End: 2020-06-26
Payer: COMMERCIAL

## 2020-06-26 VITALS
DIASTOLIC BLOOD PRESSURE: 83 MMHG | HEIGHT: 70 IN | HEART RATE: 86 BPM | SYSTOLIC BLOOD PRESSURE: 131 MMHG | WEIGHT: 289 LBS | TEMPERATURE: 97.2 F | BODY MASS INDEX: 41.37 KG/M2

## 2020-06-26 PROCEDURE — 20611 DRAIN/INJ JOINT/BURSA W/US: CPT | Performed by: PHYSICIAN ASSISTANT

## 2020-06-26 RX ORDER — LIDOCAINE HYDROCHLORIDE 10 MG/ML
4 INJECTION, SOLUTION INFILTRATION; PERINEURAL ONCE
Status: COMPLETED | OUTPATIENT
Start: 2020-06-26 | End: 2020-06-29

## 2020-06-29 RX ADMIN — LIDOCAINE HYDROCHLORIDE 4 ML: 10 INJECTION, SOLUTION INFILTRATION; PERINEURAL at 13:41

## 2020-07-09 ENCOUNTER — OFFICE VISIT (OUTPATIENT)
Dept: PRIMARY CARE CLINIC | Age: 50
End: 2020-07-09
Payer: COMMERCIAL

## 2020-07-09 VITALS
WEIGHT: 287.2 LBS | TEMPERATURE: 97.8 F | OXYGEN SATURATION: 98 % | DIASTOLIC BLOOD PRESSURE: 82 MMHG | BODY MASS INDEX: 41.12 KG/M2 | HEIGHT: 70 IN | HEART RATE: 81 BPM | SYSTOLIC BLOOD PRESSURE: 126 MMHG

## 2020-07-09 PROBLEM — L30.9 DERMATITIS: Status: ACTIVE | Noted: 2020-07-09

## 2020-07-09 PROCEDURE — 99213 OFFICE O/P EST LOW 20 MIN: CPT | Performed by: PHYSICIAN ASSISTANT

## 2020-07-09 RX ORDER — ALCLOMETASONE DIPROPIONATE 0.5 MG/G
CREAM TOPICAL
Qty: 15 G | Refills: 0 | Status: SHIPPED
Start: 2020-07-09 | End: 2021-04-08 | Stop reason: CLARIF

## 2020-07-09 ASSESSMENT — PATIENT HEALTH QUESTIONNAIRE - PHQ9
2. FEELING DOWN, DEPRESSED OR HOPELESS: 0
SUM OF ALL RESPONSES TO PHQ QUESTIONS 1-9: 0
1. LITTLE INTEREST OR PLEASURE IN DOING THINGS: 0
SUM OF ALL RESPONSES TO PHQ9 QUESTIONS 1 & 2: 0
SUM OF ALL RESPONSES TO PHQ QUESTIONS 1-9: 0

## 2020-07-09 ASSESSMENT — ENCOUNTER SYMPTOMS
COLOR CHANGE: 0
ABDOMINAL PAIN: 0
EYES NEGATIVE: 1
RESPIRATORY NEGATIVE: 1

## 2020-07-09 NOTE — PROGRESS NOTES
Diagnosis Orders   1. Dermatitis          Plan:    Cicalfate mixed with Aclovate  Try eliminating Detergent and soap as possible allergens. Return if symptoms worsen or fail to improve. No orders of the defined types were placed in this encounter. Orders Placed This Encounter   Medications    alclomethasone (ACLOVATE) 0.05 % cream     Sig: Apply topically 2 times daily.      Dispense:  15 g     Refill:  0           Electronically signed by Kayleen Powers 7/9/2020 at 10:43 AM

## 2020-10-05 RX ORDER — ATORVASTATIN CALCIUM 10 MG/1
TABLET, FILM COATED ORAL
Qty: 90 TABLET | Refills: 3 | Status: SHIPPED | OUTPATIENT
Start: 2020-10-05 | End: 2021-04-08 | Stop reason: ALTCHOICE

## 2020-10-05 NOTE — TELEPHONE ENCOUNTER
INCOMING FAX:    LOV 07/09/20Sina Perez in [address] [place] [address]       If needed see request scanned in below

## 2020-10-07 ENCOUNTER — OFFICE VISIT (OUTPATIENT)
Dept: ORTHOPEDIC SURGERY | Age: 50
End: 2020-10-07
Payer: COMMERCIAL

## 2020-10-07 VITALS
WEIGHT: 287 LBS | HEIGHT: 70 IN | DIASTOLIC BLOOD PRESSURE: 95 MMHG | HEART RATE: 87 BPM | BODY MASS INDEX: 41.09 KG/M2 | TEMPERATURE: 97.2 F | SYSTOLIC BLOOD PRESSURE: 143 MMHG

## 2020-10-07 PROCEDURE — 20611 DRAIN/INJ JOINT/BURSA W/US: CPT | Performed by: PHYSICIAN ASSISTANT

## 2020-10-07 PROCEDURE — 99213 OFFICE O/P EST LOW 20 MIN: CPT | Performed by: PHYSICIAN ASSISTANT

## 2020-10-07 RX ORDER — LIDOCAINE HYDROCHLORIDE 10 MG/ML
4 INJECTION, SOLUTION INFILTRATION; PERINEURAL ONCE
Status: COMPLETED | OUTPATIENT
Start: 2020-10-07 | End: 2020-10-07

## 2020-10-07 RX ORDER — METHYLPREDNISOLONE ACETATE 40 MG/ML
40 INJECTION, SUSPENSION INTRA-ARTICULAR; INTRALESIONAL; INTRAMUSCULAR; SOFT TISSUE ONCE
Status: COMPLETED | OUTPATIENT
Start: 2020-10-07 | End: 2020-10-07

## 2020-10-07 RX ADMIN — LIDOCAINE HYDROCHLORIDE 4 ML: 10 INJECTION, SOLUTION INFILTRATION; PERINEURAL at 16:16

## 2020-10-07 RX ADMIN — METHYLPREDNISOLONE ACETATE 40 MG: 40 INJECTION, SUSPENSION INTRA-ARTICULAR; INTRALESIONAL; INTRAMUSCULAR; SOFT TISSUE at 16:16

## 2020-10-07 RX ADMIN — LIDOCAINE HYDROCHLORIDE 4 ML: 10 INJECTION, SOLUTION INFILTRATION; PERINEURAL at 16:22

## 2020-10-07 NOTE — PROGRESS NOTES
[address] [facility]  [address] [place] [id number]  Dept: [phone]  Dept Fax: [phone]          Right Knee - Follow Up     Subjective:     Chief Complaint   Patient presents with    Knee Pain     right knee pain, reinjured- 10/5/20     HPI:     Judith Mckeon presents today for Right knee pain. The patient recalls a specific injur yesterday where he stepped backwards and felt a sharp pain in his knee. Also, in August he was remodeling his patio and felt his pain was exacerbated since then. The patient has tried ibuprofen with moderate improvement. The pain is now described as [place] Reason and Sharp . There is  pain on weight bearing. The knee has swelled. There is  painful popping and clicking. The knee has caught or locked up only recently. The knee has given out recently. It is  stiff upon arising from sitting. It is  painful to go up and down stairs and sit for a prolonged time. The patient has had a cortisone injection 05/29/20. The patient has tried a lubrication injection 05/29/20. The patient has tried physical therapy when he first hurt his knee. The patient has not had surgery. ROS:   Review of Systems    Past Medical History:    Past Medical History:   Diagnosis Date    Allergic rhinitis     Sciatica     Varicocele        Past Surgical History:    Past Surgical History:   Procedure Laterality Date    WISDOM TOOTH EXTRACTION         CurrentMedications:   Current Outpatient Medications   Medication Sig Dispense Refill    atorvastatin (LIPITOR) 10 MG tablet TAKE 1 TABLET BY MOUTH ONE TIME A DAY 90 tablet 3    ibuprofen (ADVIL;MOTRIN) 200 MG tablet Take 200 mg by mouth every 6 hours as needed for Pain      alclomethasone (ACLOVATE) 0.05 % cream Apply topically 2 times daily. 15 g 0     No current facility-administered medications for this visit. Allergies:    Patient has no known allergies.     Social History:   Social History     Socioeconomic History    Marital status:      Spouse name: None    Number of children: None    Years of education: None    Highest education level: None   Occupational History    None   Social Needs    Financial resource strain: None    Food insecurity     Worry: None     Inability: None    Transportation needs     Medical: None     Non-medical: None   Tobacco Use    Smoking status: Never Smoker    Smokeless tobacco: Never Used   Substance and Sexual Activity    Alcohol use: Yes     Comment: \"once in a blue moon\"    Drug use: No    Sexual activity: None   Lifestyle    Physical activity     Days per week: None     Minutes per session: None    Stress: None   Relationships    Social connections     Talks on phone: None     Gets together: None     Attends Spiritism service: None     Active member of club or organization: None     Attends meetings of clubs or organizations: None     Relationship status: None    Intimate partner violence     Fear of current or ex partner: None     Emotionally abused: None     Physically abused: None     Forced sexual activity: None   Other Topics Concern    None   Social History Narrative    None       Family History:  Family History   Problem Relation Age of Onset    Other Mother         cerebral aneurysm    Prostate Cancer Father     Kidney Disease Father     Coronary Art Dis Father     Breast Cancer Paternal Grandmother     Other Paternal Grandfather         ALS    Diabetes Sister         Type 2       Vitals:   BP (!) 143/95   Pulse 87   Temp 97.2 °F (36.2 °C)   Ht 5' 10\" (1.778 m)   Wt 287 lb (130.2 kg)   BMI 41.18 kg/m²  Body mass index is 41.18 kg/m². Physical Examination:     Orthopedics:    GENERAL: Alert and oriented X3 in no acute distress. SKIN: Intact without lesions or ulcerations. NEURO: Intact to sensory and motor testing. VASC: Capillary refill is less than 3 seconds.     KNEE EXAM    LOCATION: Right Knee  GEN: Alert and oriented X 3, in no acute distress. GAIT: The patient's gait was observed while entering the exam room and was noted to be   antalgic. The extremity is in anatomic alignment. SKIN: Intact without rashes, lesions, or ulcerations. No obvious deformity or swelling. NEURO: The patient responds to light touch throughout bilateral LE. Patellar and Achilles reflexes are 2/4. VASC: The right LE is neurovascularly intact with 2/4 DP and 2/4 PT pulses. Brisk capillary refill. ROM: 0/130 degrees. There is mild effusion. Pain with hyperflexion  MUSC: slightly decreased quad tone  LIGAMENT: Lachman's test is Negative with Good endpoint. Anterior drawer Negative. Posterior drawer Negative. There is No varus instability at 0 degrees and No varus instability at 30 degrees. There is 1+ valgus instability at 0 degrees and 1+ valgus instability at 30 degrees. SPECIAL: Christiano test is positive with + clunks, significant crepitation, and mild pain. Assessment:     1. Primary osteoarthritis of right knee      Procedures:    Procedure: yes    Arthrocentesis Right Knee  After consent was obtained, using sterile technique the right knee was prepped and plain Lidocaine 1% was used as local anesthetic. The joint was entered and 23 ml's of straw colored fluid was. Steroid 40 mg and 4 ml plain Lidocaine was then injected and the needle withdrawn. The procedure was well tolerated. The patient is asked to continue to rest the joint for a few more days before resuming regular activities. It may be more painful for the first 1-2 days. Watch for fever, or increased swelling or persistent pain in the joint. Call or return to clinic prn if such symptoms occur or there is failure to improve as anticipated. Regular Knee Injection    Location: right knee  Procedure: Corticosteroid injection into the knee. the patient was placed in the supine position on the exam table.  The superior lateral portal was identified and marked. the skin was prepped with betadine in a sterile fashion. Utilizing bTendo ultrasound unit with a variable frequency linear transducer was used for precise placement and clean technique with sterile gloves a 5cc solution containing 4cc of 1.0% Lidocaine with 1cc containing 40mg of Depo-medrol was injected. There was no resistance to the injection. The wound was cleansed and a band-aid was placed. the patient tolerated the procedure without difficulty. Adverse reactions to the injection were discussed with the patient including signs of infection (increasing pain, redness, swelling) and the patient was instructed to call immediately if experiencing any of these symptoms. Radiology:   Xr Knee Right (min 4 Views)    Result Date: 10/8/2020  KNEE X-RAY 4 views of the right knee including AP, bilateral tunnel, and lateral in the upright position, and skyline views reveal slight varus alignment with no fracture or dislocation. Kellgren grade III changes of osteoarthritis (joint space narrowing, osteophyte, subchondral sclerosis, bony deformity/cyst) of the medial compartment(s). No osseous loose bodies. No bony erosion or periosteal reaction. No soft tissue masses. Impression: Moderate osteoarthritis of the right knee. Plan:   Treatment : I reviewed the X-ray with the patient and I informed them that there is worsening of right knee osteoarthritis Kellgren grade II. We discussed the etiologies and natural histories of osteoarthritis. We discussed the various treatment alternatives including anti-inflammatory medications, physical therapy, injections, further imaging studies and as a last result surgery. During today's visit, we performed an arthrocentesis of the right knee and followed with a cortisone injection as they have provided significant relief for the patient at prior appointments. A physical therapy prescription was not given.   If the patient continues to get mechanical catching and locking or sharp stabbing pain we may have to consider a new MRI of his right knee to look for meniscal root tear. .  Patient should return to the clinic in 3 months to follow up with  Laurie Lilly PA-C. The patient will call the office immediately with any problems. Orders Placed This Encounter   Medications    lidocaine 1 % injection 4 mL    methylPREDNISolone acetate (DEPO-MEDROL) injection 40 mg    lidocaine 1 % injection 4 mL       No orders of the defined types were placed in this encounter. Demetri Christensen PA-C, have personally seen this patient, reviewed the chart including history, and imaging if done. I personally  performed the physical exam and obtained any needed additional history. I placed orders, performed or supervised procedures and developed the treatment plan.     Electronically signed by Janneth Santillan PA-C, on 10/8/2020 at 9:13 AM      Electronically signed by Marshall Byrd, on 10/7/2020 at 4:41 PM

## 2021-04-08 ENCOUNTER — OFFICE VISIT (OUTPATIENT)
Dept: PRIMARY CARE CLINIC | Age: 51
End: 2021-04-08
Payer: COMMERCIAL

## 2021-04-08 VITALS
HEIGHT: 70 IN | SYSTOLIC BLOOD PRESSURE: 144 MMHG | WEIGHT: 297 LBS | OXYGEN SATURATION: 98 % | DIASTOLIC BLOOD PRESSURE: 90 MMHG | BODY MASS INDEX: 42.52 KG/M2 | HEART RATE: 97 BPM

## 2021-04-08 DIAGNOSIS — R03.0 ELEVATED BP WITHOUT DIAGNOSIS OF HYPERTENSION: ICD-10-CM

## 2021-04-08 DIAGNOSIS — Z13.6 SCREENING FOR ISCHEMIC HEART DISEASE: ICD-10-CM

## 2021-04-08 DIAGNOSIS — Z13.1 SCREENING FOR DIABETES MELLITUS: ICD-10-CM

## 2021-04-08 DIAGNOSIS — E78.5 HYPERLIPIDEMIA, UNSPECIFIED HYPERLIPIDEMIA TYPE: Primary | ICD-10-CM

## 2021-04-08 DIAGNOSIS — Z00.00 HEALTH CARE MAINTENANCE: ICD-10-CM

## 2021-04-08 DIAGNOSIS — L91.8 CUTANEOUS SKIN TAGS: ICD-10-CM

## 2021-04-08 PROBLEM — L72.3 SEBACEOUS CYST: Status: RESOLVED | Noted: 2017-03-23 | Resolved: 2021-04-08

## 2021-04-08 PROBLEM — L90.0 LICHEN SCLEROSUS ET ATROPHICUS: Status: RESOLVED | Noted: 2017-03-15 | Resolved: 2021-04-08

## 2021-04-08 PROCEDURE — 99214 OFFICE O/P EST MOD 30 MIN: CPT | Performed by: PHYSICIAN ASSISTANT

## 2021-04-08 SDOH — ECONOMIC STABILITY: FOOD INSECURITY: WITHIN THE PAST 12 MONTHS, YOU WORRIED THAT YOUR FOOD WOULD RUN OUT BEFORE YOU GOT MONEY TO BUY MORE.: NEVER TRUE

## 2021-04-08 SDOH — ECONOMIC STABILITY: INCOME INSECURITY: HOW HARD IS IT FOR YOU TO PAY FOR THE VERY BASICS LIKE FOOD, HOUSING, MEDICAL CARE, AND HEATING?: NOT HARD AT ALL

## 2021-04-08 SDOH — ECONOMIC STABILITY: TRANSPORTATION INSECURITY
IN THE PAST 12 MONTHS, HAS THE LACK OF TRANSPORTATION KEPT YOU FROM MEDICAL APPOINTMENTS OR FROM GETTING MEDICATIONS?: NO

## 2021-04-08 SDOH — ECONOMIC STABILITY: FOOD INSECURITY: WITHIN THE PAST 12 MONTHS, THE FOOD YOU BOUGHT JUST DIDN'T LAST AND YOU DIDN'T HAVE MONEY TO GET MORE.: NEVER TRUE

## 2021-04-08 ASSESSMENT — ENCOUNTER SYMPTOMS
APNEA: 0
ABDOMINAL PAIN: 0
RESPIRATORY NEGATIVE: 1
BACK PAIN: 0
CHEST TIGHTNESS: 0
SHORTNESS OF BREATH: 0
COLOR CHANGE: 0
COUGH: 0

## 2021-04-08 ASSESSMENT — PATIENT HEALTH QUESTIONNAIRE - PHQ9
SUM OF ALL RESPONSES TO PHQ QUESTIONS 1-9: 0
SUM OF ALL RESPONSES TO PHQ9 QUESTIONS 1 & 2: 0
1. LITTLE INTEREST OR PLEASURE IN DOING THINGS: 0
2. FEELING DOWN, DEPRESSED OR HOPELESS: 0
SUM OF ALL RESPONSES TO PHQ QUESTIONS 1-9: 0
SUM OF ALL RESPONSES TO PHQ QUESTIONS 1-9: 0

## 2021-04-08 NOTE — PROGRESS NOTES
[address] [address]  Dept: [phone]    Melissa Buckner is a 54 y.o. male who presents today for his medical conditions/complaints as noted below. Chief Complaint   Patient presents with    Discuss Medications     Patient would like to discuss starting cholestrol medication and having labs he is due for done.  Other     discuss weight. HPI:     HPI  BPS elevated over last few years--he says it is high today because he had a conflict at work today. Tried Lipitor and did get aches so stopped taking it  After 3 weeks on it again he started having knee and plantar fascia pain again. He has made some diet changes and wants to get back to exercise. REflux is better with these   Would be willing to try Crestor in future. Would like some skin tags removed.        LDL Cholesterol (mg/dL)   Date Value   12/27/2019 120     LDL Calculated (mg/dL)   Date Value   07/03/2018 151       (goal LDL is <100)   AST (U/L)   Date Value   12/27/2019 21     ALT (U/L)   Date Value   12/27/2019 32     BUN (mg/dL)   Date Value   12/27/2019 16     BP Readings from Last 3 Encounters:   04/08/21 (!) 144/90   10/07/20 (!) 143/95   07/09/20 126/82          (goal 120/80)    Past Medical History:   Diagnosis Date    Allergic rhinitis     Sciatica     Varicocele       Past Surgical History:   Procedure Laterality Date    WISDOM TOOTH EXTRACTION         Family History   Problem Relation Age of Onset    Other Mother         cerebral aneurysm    Prostate Cancer Father     Kidney Disease Father     Coronary Art Dis Father     Breast Cancer Paternal Grandmother     Other Paternal Grandfather         ALS    Diabetes Sister         Type 2       Social History     Tobacco Use    Smoking status: Never Smoker    Smokeless tobacco: Never Used   Substance Use Topics    Alcohol use: Yes     Comment: \"once in a blue moon\"      Current Outpatient Medications   Medication Sig Dispense Refill    ibuprofen (ADVIL;MOTRIN) 200 MG tablet Take 200 mg by mouth every 6 hours as needed for Pain       No current facility-administered medications for this visit. No Known Allergies    Health Maintenance   Topic Date Due    Hepatitis C screen  Never done    HIV screen  Never done    COVID-19 Vaccine (1) Never done    Flu vaccine (Season Ended) 09/01/2021    Diabetes screen  12/27/2022    Lipid screen  12/27/2024    DTaP/Tdap/Td vaccine (3 - Td) 03/23/2027    Hepatitis A vaccine  Aged Out    Hepatitis B vaccine  Aged Out    Hib vaccine  Aged Out    Meningococcal (ACWY) vaccine  Aged Out    Pneumococcal 0-64 years Vaccine  Aged Out       Subjective:      Review of Systems   Constitutional: Negative for activity change, appetite change, fatigue and unexpected weight change. Respiratory: Negative. Negative for apnea, cough, chest tightness and shortness of breath. Cardiovascular: Negative. Negative for chest pain, palpitations and leg swelling. Gastrointestinal: Negative for abdominal pain. Reflux is getting better      Endocrine: Negative for cold intolerance, heat intolerance, polydipsia, polyphagia and polyuria. Musculoskeletal: Negative for arthralgias, back pain and myalgias. Skin: Negative for color change. Neurological: Negative for dizziness, syncope, weakness, light-headedness and headaches. Psychiatric/Behavioral: Negative for behavioral problems, dysphoric mood and sleep disturbance. The patient is not nervous/anxious. Objective:     BP (!) 144/90   Pulse 97   Ht 5' 10\" (1.778 m)   Wt 297 lb (134.7 kg)   SpO2 98%   BMI 42.62 kg/m²   Physical Exam  Vitals signs reviewed. Constitutional:       Appearance: Normal appearance. He is obese. He is not ill-appearing. Neck:      Musculoskeletal: No edema. Vascular: No carotid bruit.    Cardiovascular:      Rate and Rhythm: Normal rate and regular rhythm. Heart sounds: Normal heart sounds. No murmur. No friction rub. No gallop. Pulmonary:      Effort: Pulmonary effort is normal.      Breath sounds: Normal breath sounds. Skin:     Comments: Left neck: large cluster of tags  Tags around right eye   Neurological:      Mental Status: He is alert and oriented to person, place, and time. Psychiatric:         Mood and Affect: Mood normal.         Assessment:       Diagnosis Orders   1. Hyperlipidemia, unspecified hyperlipidemia type  Lipid Panel    ALT    AST   2. Health care maintenance  Comprehensive Metabolic Panel, Fasting    TSH without Reflex   3. Screening for ischemic heart disease  Lipid Panel    ALT    AST   4. Screening for diabetes mellitus  Basic Metabolic Panel, Fasting   5. Cutaneous skin tags     6. Elevated BP without diagnosis of hypertension          Plan:    Recheck BP  BW now and 3 months before NV  Discussed starting Crestor--will try diet and exercise to see how much that helps. Diet and  discussed  Quote on skin tags given  May need to start BP med NV--monitor this     Return in about 3 months (around 7/8/2021) for HLD, HTN.     Orders Placed This Encounter   Procedures    Lipid, Fasting     Standing Status:   Future     Standing Expiration Date:   4/8/2022    Comprehensive Metabolic Panel, Fasting     Standing Status:   Future     Standing Expiration Date:   4/8/2022    TSH without Reflex     Standing Status:   Future     Standing Expiration Date:   4/8/2022    Lipid Panel     Standing Status:   Future     Standing Expiration Date:   4/8/2022     Order Specific Question:   Is Patient Fasting?/# of Hours     Answer:   10-12 hours    ALT     Standing Status:   Future     Standing Expiration Date:   4/8/2022    AST     Standing Status:   Future     Standing Expiration Date:   4/8/2022    Basic Metabolic Panel, Fasting     Standing Status:   Future     Standing Expiration Date:   4/8/2022     No orders of the defined types were placed in this encounter. Patient given educational materials - see patient instructions. Discussed use, benefit, and side effects of prescribed medications. All patient questions answered. Pt voiced understanding. Reviewed health maintenance. Instructed to continue current medications, diet and exercise. Patient agreed with treatment plan. Follow up as directed.      Electronically signed by Katey Khanna PA-C on 4/8/2021 at 7:37 PM

## 2021-04-28 ENCOUNTER — HOSPITAL ENCOUNTER (OUTPATIENT)
Age: 51
Setting detail: SPECIMEN
Discharge: HOME | End: 2021-04-28
Payer: COMMERCIAL

## 2021-04-28 DIAGNOSIS — Z13.6 SCREENING FOR ISCHEMIC HEART DISEASE: ICD-10-CM

## 2021-04-28 DIAGNOSIS — Z00.00 HEALTH CARE MAINTENANCE: ICD-10-CM

## 2021-04-28 DIAGNOSIS — E78.5 HYPERLIPIDEMIA, UNSPECIFIED HYPERLIPIDEMIA TYPE: ICD-10-CM

## 2021-04-28 LAB
ALBUMIN SERPL-MCNC: 4.6 G/DL (ref 3.5–5.2)
ALBUMIN/GLOBULIN RATIO: 1.8 (ref 1–2.5)
ALP BLD-CCNC: 63 U/L (ref 40–129)
ALT SERPL-CCNC: 26 U/L (ref 5–41)
ANION GAP SERPL CALCULATED.3IONS-SCNC: 11 MMOL/L (ref 9–17)
AST SERPL-CCNC: 21 U/L
BILIRUB SERPL-MCNC: 0.44 MG/DL (ref 0.3–1.2)
BUN BLDV-MCNC: 12 MG/DL (ref 6–20)
BUN/CREAT BLD: ABNORMAL (ref 9–20)
CALCIUM SERPL-MCNC: 9.4 MG/DL (ref 8.6–10.4)
CHLORIDE BLD-SCNC: 102 MMOL/L (ref 98–107)
CHOLESTEROL/HDL RATIO: 5.4
CHOLESTEROL: 232 MG/DL
CO2: 28 MMOL/L (ref 20–31)
CREAT SERPL-MCNC: 1.05 MG/DL (ref 0.7–1.2)
GFR AFRICAN AMERICAN: >60 ML/MIN
GFR NON-AFRICAN AMERICAN: >60 ML/MIN
GFR SERPL CREATININE-BSD FRML MDRD: ABNORMAL ML/MIN/{1.73_M2}
GFR SERPL CREATININE-BSD FRML MDRD: ABNORMAL ML/MIN/{1.73_M2}
GLUCOSE FASTING: 104 MG/DL (ref 70–99)
HDLC SERPL-MCNC: 43 MG/DL
LDL CHOLESTEROL: 155 MG/DL (ref 0–130)
POTASSIUM SERPL-SCNC: 5 MMOL/L (ref 3.7–5.3)
SODIUM BLD-SCNC: 141 MMOL/L (ref 135–144)
TOTAL PROTEIN: 7.1 G/DL (ref 6.4–8.3)
TRIGL SERPL-MCNC: 172 MG/DL
TSH SERPL DL<=0.05 MIU/L-ACNC: 1.51 MIU/L (ref 0.3–5)
VLDLC SERPL CALC-MCNC: ABNORMAL MG/DL (ref 1–30)

## 2021-04-29 DIAGNOSIS — E78.5 HYPERLIPIDEMIA, UNSPECIFIED HYPERLIPIDEMIA TYPE: Primary | ICD-10-CM

## 2021-04-29 RX ORDER — ROSUVASTATIN CALCIUM 5 MG/1
5 TABLET, COATED ORAL DAILY
Qty: 90 TABLET | Refills: 0 | Status: SHIPPED | OUTPATIENT
Start: 2021-04-29

## 2022-05-05 RX ORDER — VALACYCLOVIR HYDROCHLORIDE 1 G/1
1000 TABLET, FILM COATED ORAL 3 TIMES DAILY
Qty: 21 TABLET | Refills: 0 | Status: SHIPPED | OUTPATIENT
Start: 2022-05-05

## 2022-09-15 DIAGNOSIS — M25.562 LEFT KNEE PAIN, UNSPECIFIED CHRONICITY: Primary | ICD-10-CM

## 2022-09-16 ENCOUNTER — OFFICE VISIT (OUTPATIENT)
Dept: ORTHOPEDIC SURGERY | Age: 52
End: 2022-09-16
Payer: COMMERCIAL

## 2022-09-16 VITALS
HEART RATE: 78 BPM | WEIGHT: 282 LBS | HEIGHT: 70 IN | SYSTOLIC BLOOD PRESSURE: 130 MMHG | BODY MASS INDEX: 40.37 KG/M2 | RESPIRATION RATE: 14 BRPM | DIASTOLIC BLOOD PRESSURE: 82 MMHG

## 2022-09-16 DIAGNOSIS — S83.282A ACUTE LATERAL MENISCUS TEAR OF LEFT KNEE, INITIAL ENCOUNTER: Primary | ICD-10-CM

## 2022-09-16 PROCEDURE — 99213 OFFICE O/P EST LOW 20 MIN: CPT | Performed by: PHYSICIAN ASSISTANT

## 2022-09-16 ASSESSMENT — ENCOUNTER SYMPTOMS
COLOR CHANGE: 0
RESPIRATORY NEGATIVE: 1
COUGH: 0
SHORTNESS OF BREATH: 0
CONSTIPATION: 0
ABDOMINAL DISTENTION: 0
NAUSEA: 0
CHEST TIGHTNESS: 0
DIARRHEA: 0
ABDOMINAL PAIN: 0
APNEA: 0
VOMITING: 0

## 2022-09-16 NOTE — PROGRESS NOTES
[address] [place] [address] AND SPORTS MEDICINE  [facility] [address]  Dept: [phone]  Dept Fax: [phone]          Left Knee - Follow Up     Subjective:     Chief Complaint   Patient presents with    Knee Pain     L Knee Pain     HPI:     Olya Hood presents today for Left knee pain. The pain has been present for 1 months. The patient recalls a specific injury where the knee began having pain when he stepped down from his truck. The patient has tried ibuprofen, heat, ice with moderate improvement. The pain is now described as Stabbing  and Achy . There is pain on weight bearing. The knee has not swelled. There is not painful popping and clicking. The knee has caught or locked up x2. The knee has given out. It is  stiff upon arising from sitting. It is  painful to go up and down stairs and sit for a prolonged time. The patient has not had a cortisone injection. The patient has not tried a lubrication injection. The patient has not tried physical therapy. The patient has not had surgery. ROS:   Review of Systems   Constitutional:  Positive for activity change. Negative for appetite change, fatigue and fever. Respiratory: Negative. Negative for apnea, cough, chest tightness and shortness of breath. Cardiovascular: Negative. Negative for chest pain, palpitations and leg swelling. Gastrointestinal:  Negative for abdominal distention, abdominal pain, constipation, diarrhea, nausea and vomiting. Genitourinary:  Negative for difficulty urinating, dysuria and hematuria. Musculoskeletal:  Positive for arthralgias, gait problem and joint swelling. Negative for myalgias. Skin:  Negative for color change and rash. Neurological:  Negative for dizziness, weakness, numbness and headaches. Psychiatric/Behavioral:  Positive for sleep disturbance.       Past Medical History:    Past Medical History: Diagnosis Date    Allergic rhinitis     Sciatica     Varicocele        Past Surgical History:    Past Surgical History:   Procedure Laterality Date    WISDOM TOOTH EXTRACTION         CurrentMedications:   Current Outpatient Medications   Medication Sig Dispense Refill    ibuprofen (ADVIL;MOTRIN) 200 MG tablet Take 200 mg by mouth every 6 hours as needed for Pain      valACYclovir (VALTREX) 1 g tablet Take 1 tablet by mouth 3 times daily For shingles (Patient not taking: Reported on 9/16/2022) 21 tablet 0    rosuvastatin (CRESTOR) 5 MG tablet Take 1 tablet by mouth daily (Patient not taking: Reported on 9/16/2022) 90 tablet 0     No current facility-administered medications for this visit. Allergies:    Lipitor [atorvastatin]    Social History:   Social History     Socioeconomic History    Marital status:      Spouse name: None    Number of children: None    Years of education: None    Highest education level: None   Tobacco Use    Smoking status: Never    Smokeless tobacco: Never   Substance and Sexual Activity    Alcohol use: Yes     Comment: \"once in a blue moon\"    Drug use: No       Family History:  Family History   Problem Relation Age of Onset    Other Mother         cerebral aneurysm    Prostate Cancer Father     Kidney Disease Father     Coronary Art Dis Father     Breast Cancer Paternal Grandmother     Other Paternal Grandfather         ALS    Diabetes Sister         Type 2       Vitals:   /82   Pulse 78   Resp 14   Ht 5' 10\" (1.778 m)   Wt 282 lb (127.9 kg)   BMI 40.46 kg/m²  Body mass index is 40.46 kg/m². Physical Examination:     Orthopedics:    GENERAL: Alert and oriented X3 in no acute distress. SKIN: Intact without lesions or ulcerations. NEURO: Intact to sensory and motor testing. VASC: Capillary refill is less than 3 seconds. KNEE EXAM    LOCATION: Left Knee  GEN: Alert and oriented X 3, in no acute distress.   GAIT: The patient's gait was observed while entering the exam room and was noted to be antalgic. The extremity is in anatomic alignment. SKIN: Intact without rashes, lesions, or ulcerations. No obvious deformity or swelling. NEURO: The patient responds to light touch throughout bilateral LE. Patellar and Achilles reflexes are 2/4. VASC: The bilateral LE is neurovascularly intact with 2/4 DP and 2/4 PT pulses. Brisk capillary refill. ROM: 0/125 degrees. There is no effusion. MUSC: slightly decreased quad tone  LIGAMENT: Lachman's test is Negative with Good endpoint. Anterior drawer Negative. Posterior drawer Negative. There is No varus instability at 0 degrees and No varus instability at 30 degrees. There is No valgus instability at 0 degrees and No valgus instability at 30 degrees. SPECIAL: Christiano test is positive with + clunks, + crepitation, and + pain. PALP: There is medial  and lateral joint line pain. Assessment:     1. Acute lateral meniscus tear of left knee, initial encounter      Procedures:    Procedure: no  Radiology:   KNEE X-RAY    4 views of the left knee including AP, bilateral tunnel, and lateral in the upright position, and skyline views reveal anatomic alignment with no fracture or dislocation. Kellgren grade II changes of osteoarthritis (joint space narrowing, osteophyte, subchondral sclerosis, bony deformity/cyst) of the patellofemoral compartment(s). No osseous loose bodies. No bony erosion or periosteal reaction. No soft tissue masses. Impression: Negative radiographs of the left knee. Plan:   Treatment : I reviewed the x-ray with the patient and I informed them that the was negative for any bony abnormalities. He does have some crepitation of his kneecap on exam.. We discussed the etiologies and natural histories of medial/lateral meniscal tear. We discussed the various treatment alternatives including anti-inflammatory medications, physical therapy, injections, further imaging studies and as a last result surgery. During today's visit, we discussed that I do have a very high concern for a meniscal tear. He will need a knee arthroscopy. I would like to get an MRI for surgical planning. The patient has opted for of his knee. He will then follow-up after the MRI determine further treatment. The patient will call the office immediately with any problems. No orders of the defined types were placed in this encounter. Orders Placed This Encounter   Procedures    MRI KNEE LEFT WO CONTRAST     Standing Status:   Future     Standing Expiration Date:   9/16/2023     Order Specific Question:   Reason for exam:     Answer:   r/o medial/lateral mensical tear     Order Specific Question:   What is the sedation requirement? Answer:   None         This note is created with the assistance of a speech recognition program.  While intending to generate a document that actually reflects the content of the visit, the document can still have some errors including those of syntax and sound a like substitutions which may escape proof reading.   In such instances, actual meaning can be extrapolated by contextual diversion    Electronically signed by Alexandra Jurado PA-C, on 9/16/2022 at 12:34 PM

## 2022-09-16 NOTE — PATIENT INSTRUCTIONS
[address] [facility] and Sports Medicine    Gillian Oviedo PA-C, ATC    Over the counter anti-inflammatory protocol (NSAIDS)    You may take the following over the counter medication for only 10-14 days to  reduce inflammation. If you develop an upset stomach, diarrhea, heartburn/GERD symptoms   discontinue immediately. If you need the medication on a long-term basis >greater than 10-14 days. Please contact your family doctor/PCP to discuss your options as you   will require ongoing medical monitoring. Over the Counter/OTC             Ibuprofen/Advil/Motrin                                                                                                            200 mg tablets                  3-4 tables 3 times a day with food             OR            Naproxen Sodium, Aleve                    220 mg tablets          2 tablets 2 times a day with food           You May Add                           Tylenol extra strength, Acetaminophen           500 mg tablets               2 tablets every 8 hours    You may alternate with the NSAIDS for pain. NO more than 3000 mg of Tylenol/acetaminophen in 24 hours. '  Look at any OTC medications for added  Tylenol/acetaminophen--cold/sinus/flu medication.

## 2022-09-23 ENCOUNTER — HOSPITAL ENCOUNTER (OUTPATIENT)
Dept: MRI IMAGING | Age: 52
Discharge: HOME | End: 2022-09-25
Payer: COMMERCIAL

## 2022-09-23 DIAGNOSIS — S83.282A ACUTE LATERAL MENISCUS TEAR OF LEFT KNEE, INITIAL ENCOUNTER: ICD-10-CM

## 2022-09-23 PROCEDURE — 73721 MRI JNT OF LWR EXTRE W/O DYE: CPT

## 2022-10-05 ENCOUNTER — OFFICE VISIT (OUTPATIENT)
Dept: ORTHOPEDIC SURGERY | Age: 52
End: 2022-10-05
Payer: COMMERCIAL

## 2022-10-05 VITALS — RESPIRATION RATE: 12 BRPM | HEIGHT: 70 IN | BODY MASS INDEX: 39.65 KG/M2 | WEIGHT: 277 LBS

## 2022-10-05 DIAGNOSIS — M22.42 CHONDROMALACIA, PATELLA, LEFT: Primary | ICD-10-CM

## 2022-10-05 PROCEDURE — 20611 DRAIN/INJ JOINT/BURSA W/US: CPT | Performed by: PHYSICIAN ASSISTANT

## 2022-10-05 RX ORDER — METHYLPREDNISOLONE ACETATE 40 MG/ML
40 INJECTION, SUSPENSION INTRA-ARTICULAR; INTRALESIONAL; INTRAMUSCULAR; SOFT TISSUE ONCE
Status: COMPLETED | OUTPATIENT
Start: 2022-10-05 | End: 2022-10-05

## 2022-10-05 RX ORDER — LIDOCAINE HYDROCHLORIDE 10 MG/ML
2 INJECTION, SOLUTION INFILTRATION; PERINEURAL ONCE
Status: COMPLETED | OUTPATIENT
Start: 2022-10-05 | End: 2022-10-05

## 2022-10-05 RX ADMIN — METHYLPREDNISOLONE ACETATE 40 MG: 40 INJECTION, SUSPENSION INTRA-ARTICULAR; INTRALESIONAL; INTRAMUSCULAR; SOFT TISSUE at 16:19

## 2022-10-05 RX ADMIN — LIDOCAINE HYDROCHLORIDE 2 ML: 10 INJECTION, SOLUTION INFILTRATION; PERINEURAL at 16:18

## 2022-10-05 ASSESSMENT — ENCOUNTER SYMPTOMS
DIARRHEA: 0
COUGH: 0
CONSTIPATION: 0
ABDOMINAL PAIN: 0
RESPIRATORY NEGATIVE: 1
APNEA: 0
SHORTNESS OF BREATH: 0
COLOR CHANGE: 0
VOMITING: 0
NAUSEA: 0
ABDOMINAL DISTENTION: 0
CHEST TIGHTNESS: 0

## 2022-10-05 NOTE — PROGRESS NOTES
[address] [place] [address] AND SPORTS MEDICINE  [facility] Ian Fernando  [facility] [address]  Dept: [phone]  Dept Fax: [phone]        Ambulatory Follow Up      Subjective: Erica Cespedes is a 50y.o. year old male who presents to our office today for routine followup regarding his   1. Chondromalacia, patella, left    . Chief Complaint   Patient presents with    Knee Pain     L Knee  MRI Review        HPI Erica Cespedes  is a 50 y.o.  male who presents today in follow for left knee pain. The patient was last seen on 9/16/2022 and underwent treatment in the form of ordering an MRI. He is here today for MRI review. He states he has had increased knee pain for the last couple of months after an injury when he was stepping down from his truck. At his last appointment, he was talking about having mechanical catching and locking. The patient has not had a cortisone injection. He has not tried physical therapy. He has not had surgery. Review of Systems   Constitutional:  Positive for activity change. Negative for appetite change, fatigue and fever. Respiratory: Negative. Negative for apnea, cough, chest tightness and shortness of breath. Cardiovascular: Negative. Negative for chest pain, palpitations and leg swelling. Gastrointestinal:  Negative for abdominal distention, abdominal pain, constipation, diarrhea, nausea and vomiting. Genitourinary:  Negative for difficulty urinating, dysuria and hematuria. Musculoskeletal:  Positive for arthralgias and gait problem. Negative for joint swelling and myalgias. Skin:  Negative for color change and rash. Neurological:  Negative for dizziness, weakness, numbness and headaches. Psychiatric/Behavioral:  Positive for sleep disturbance. Objective :   Resp 12   Ht 5' 10\" (1.778 m)   Wt 277 lb (125.6 kg)   BMI 39.75 kg/m²  Body mass index is 39.75 kg/m².   General: Erica Cespedes is a 50 y.o. male who is alert and oriented and sitting comfortably in our office. Orthopedics:     GENERAL: Alert and oriented X3 in no acute distress. SKIN: Intact without lesions or ulcerations. NEURO: Intact to sensory and motor testing. VASC: Capillary refill is less than 3 seconds. KNEE EXAM     LOCATION: Left Knee  GEN: Alert and oriented X 3, in no acute distress. GAIT: The patient's gait was observed while entering the exam room and was noted to be antalgic. The extremity is in anatomic alignment. SKIN: Intact without rashes, lesions, or ulcerations. No obvious deformity or swelling. NEURO: The patient responds to light touch throughout bilateral LE. Patellar and Achilles reflexes are 2/4. VASC: The bilateral LE is neurovascularly intact with 2/4 DP and 2/4 PT pulses. Brisk capillary refill. ROM: 0/125 degrees. There is no effusion. MUSC: slightly decreased quad tone  LIGAMENT: Lachman's test is Negative with Good endpoint. Anterior drawer Negative. Posterior drawer Negative. There is No varus instability at 0 degrees and No varus instability at 30 degrees. There is No valgus instability at 0 degrees and No valgus instability at 30 degrees. SPECIAL: Christiano test is positive with + clunks, + crepitation, and + pain. PALP: There is lateral joint line pain. Radiology:     MRI KNEE LEFT WO CONTRAST    Result Date: 9/26/2022  1. Degeneration in the posterior horn and body of the medial meniscus. No meniscal tear. 2. Minimal tricompartmental osteoarthrosis. Mild medial compartment chondromalacia. Mild-to-moderate patellofemoral chondromalacia. 3. No acute osseous abnormality. 4. No acute ligamentous injury.         Procedure:     Regular Knee Injection    Location: Left Knee  Procedure: I discussed in detail the risks, benefits and complications of the corticosteroid injection which included but are not limited to: infection, skin reactions, hot swollen, and anaphylaxis with the patient. Sandy Miguel Angel verbalized understanding and they have agreed to have the corticosteroid injection into the left knee. The patient was placed in the Supine position on the exam table. The superior lateral portal was identified and marked with a ball point pen. The skin was prepped with betadine in a sterile fashion. Utilizing a MasterImage 3D ultrasound unit with a variable frequency linear transducer and clean technique with sterile gloves, a 3 cc solution containing 2 cc of 1% lidocaine   with 1 cc containing 40 mg of Depo-Medrol was injected. There was no resistance to the injection. The wound was cleansed and a band-aid was placed. the patient tolerated the procedure without difficulty. Adverse reactions to the injection were discussed with the patient including signs of infection (increasing pain, redness, swelling) and the patient was instructed to call immediately if experiencing any of these symptoms. Images of the injection site were recorded throughout the procedure and are saved on the SD card which is stored in the GE ultrasound unit. All images were downloaded and stored in patient's chart. Assessment:      1. Chondromalacia, patella, left       Plan: We reviewed the patient's MRI. The MRI shows some degeneration of the medial meniscus but no obvious tear. He does have chondromalacia of his patella and some mild degenerative changes throughout the knee. It would be reasonable to try a cortisone injection and going to physical therapy. If he does not do well with those things then I will have him come back in 6 weeks with Dr. Charli Whaley to discuss a knee arthroscopy. I explained to the patient that when there is arthritis in the knee that can be a limiting factor for a knee arthroscopy to be helpful. The MRI also states there is no obvious tearing of the meniscus so the catching that he is having in his knee is most likely coming from the chondromalacia of his patella.   The patient is willing to try conservative options and has opted for a cortisone injection into the left knee to help reduce inflammation and pain. The injection site should never get red, hot, or swollen and if it does the patient will contact our office right away. The patient may experience a increase in soreness the first 24-48 hours due to a cortisone flair and can take anti-inflammatories for a short period of time to reduce that soreness. The patient should not submerge the injection site in water for a minimum of 24 hours to avoid infection. This means no lakes, pools, ponds, or hot tubs for 24 hours. If the patient is diabetic the injection may increase their blood sugar for up to one week. The patient can do this cortisone injection once every 3 months as needed. If the injections stop working and do not give the patient relief the patient should consider surgical interventions to produce long term relief. The patient will then go to physical therapy starting in 1 week and follow-up with Dr. Sebastian Piña in 6 weeks to see how he is doing. We spent over 30 minutes discussing the patient's options and answering all of his questions. The patient will call if he has any questions or concerns. Follow up:Return in about 6 weeks (around 11/16/2022).           Orders Placed This Encounter   Medications    methylPREDNISolone acetate (DEPO-MEDROL) injection 40 mg    lidocaine 1 % injection 2 mL           Orders Placed This Encounter   Procedures    [facility] [place]     Referral Priority:   Routine     Referral Type:   Eval and Treat     Referral Reason:   Specialty Services Required     Requested Specialty:   Physical Therapist     Number of Visits Requested:   1         This note is created with the assistance of a speech recognition program.  While intending to generate a document that actually reflects the content of the visit, the document can still have some errors including those of syntax and sound a like substitutions which may escape proof reading. In such instances, actual meaning can be extrapolated by contextual diversion.      Electronically signed by Nolan Story PA-C on 10/5/2022 at 5:40 PM

## 2023-10-11 ENCOUNTER — OFFICE VISIT (OUTPATIENT)
Dept: PRIMARY CARE CLINIC | Age: 53
End: 2023-10-11
Payer: COMMERCIAL

## 2023-10-11 VITALS
BODY MASS INDEX: 39.37 KG/M2 | HEART RATE: 106 BPM | HEIGHT: 70 IN | SYSTOLIC BLOOD PRESSURE: 120 MMHG | DIASTOLIC BLOOD PRESSURE: 80 MMHG | WEIGHT: 275 LBS | OXYGEN SATURATION: 96 %

## 2023-10-11 DIAGNOSIS — Z00.00 HEALTHCARE MAINTENANCE: ICD-10-CM

## 2023-10-11 DIAGNOSIS — R21 GROIN RASH: Primary | ICD-10-CM

## 2023-10-11 DIAGNOSIS — Z12.11 SCREENING FOR MALIGNANT NEOPLASM OF COLON: ICD-10-CM

## 2023-10-11 PROCEDURE — 99214 OFFICE O/P EST MOD 30 MIN: CPT | Performed by: PHYSICIAN ASSISTANT

## 2023-10-11 RX ORDER — FLUCONAZOLE 150 MG/1
150 TABLET ORAL ONCE
Qty: 1 TABLET | Refills: 0 | Status: SHIPPED | OUTPATIENT
Start: 2023-10-11 | End: 2023-10-11

## 2023-10-11 RX ORDER — NYSTATIN 100000 [USP'U]/G
POWDER TOPICAL
Qty: 60 G | Refills: 1 | Status: SHIPPED | OUTPATIENT
Start: 2023-10-11

## 2023-10-11 SDOH — ECONOMIC STABILITY: INCOME INSECURITY: HOW HARD IS IT FOR YOU TO PAY FOR THE VERY BASICS LIKE FOOD, HOUSING, MEDICAL CARE, AND HEATING?: NOT HARD AT ALL

## 2023-10-11 SDOH — ECONOMIC STABILITY: FOOD INSECURITY: WITHIN THE PAST 12 MONTHS, YOU WORRIED THAT YOUR FOOD WOULD RUN OUT BEFORE YOU GOT MONEY TO BUY MORE.: NEVER TRUE

## 2023-10-11 SDOH — ECONOMIC STABILITY: HOUSING INSECURITY
IN THE LAST 12 MONTHS, WAS THERE A TIME WHEN YOU DID NOT HAVE A STEADY PLACE TO SLEEP OR SLEPT IN A SHELTER (INCLUDING NOW)?: NO

## 2023-10-11 SDOH — ECONOMIC STABILITY: FOOD INSECURITY: WITHIN THE PAST 12 MONTHS, THE FOOD YOU BOUGHT JUST DIDN'T LAST AND YOU DIDN'T HAVE MONEY TO GET MORE.: NEVER TRUE

## 2023-10-11 ASSESSMENT — ENCOUNTER SYMPTOMS
SORE THROAT: 0
COUGH: 0
ABDOMINAL DISTENTION: 0
CONSTIPATION: 0
CHEST TIGHTNESS: 0
SHORTNESS OF BREATH: 0
ABDOMINAL PAIN: 0
DIARRHEA: 0

## 2023-10-11 ASSESSMENT — PATIENT HEALTH QUESTIONNAIRE - PHQ9
SUM OF ALL RESPONSES TO PHQ QUESTIONS 1-9: 0
2. FEELING DOWN, DEPRESSED OR HOPELESS: 0
1. LITTLE INTEREST OR PLEASURE IN DOING THINGS: 0
SUM OF ALL RESPONSES TO PHQ9 QUESTIONS 1 & 2: 0
SUM OF ALL RESPONSES TO PHQ QUESTIONS 1-9: 0

## 2023-10-11 NOTE — PROGRESS NOTES
[address] PRIMARY CARE  [address] [facility] [address]  Dept: [phone]    Maci Maradiaga is a 48 y.o. male Established patient, who presents today for his medical conditions/complaints as noted below. Chief Complaint   Patient presents with    Rash     Rash on both sides near groin area noticed 3 weeks ago, patient states he just moved so the water is different. Looks like a red line before it was blotchy , denies being painful or itchy. Used clobetasol cream wife had , he said it worked but stopped using it & it came back. Other     Vaccines-declined       HPI:     HPI: The patient was having a rash in groin about for the last 3 weeks. Patient tried clobetasol which cleared up rash however he is now noticing that the rash came back. No pain no itching no fevers. Tried clobetasol and skin restoring cream that he gets here. Has been drying area with hair dryer at night. He has moved and noticed under his scrotum is itchy. Has been weaing more spandex like boxer brief.      Reviewed prior notes None  Reviewed previous Labs    LDL Cholesterol (mg/dL)   Date Value   04/28/2021 155 (H)   12/27/2019 120     LDL Calculated (mg/dL)   Date Value   07/03/2018 151       (goal LDL is <100)   AST (U/L)   Date Value   04/28/2021 21     ALT (U/L)   Date Value   04/28/2021 26     BUN (mg/dL)   Date Value   04/28/2021 12     TSH (mIU/L)   Date Value   04/28/2021 1.51     BP Readings from Last 3 Encounters:   10/11/23 120/80   09/16/22 130/82   04/08/21 (!) 144/90          (goal 120/80)  No results found for: \"LABA1C\"  Past Medical History:   Diagnosis Date    Allergic rhinitis     Sciatica     Varicocele       Past Surgical History:   Procedure Laterality Date    WISDOM TOOTH EXTRACTION         Family History   Problem Relation Age of Onset    Other Mother         cerebral aneurysm    Prostate Cancer Father     Kidney Disease Father     Coronary Art Dis Father

## 2023-10-16 ENCOUNTER — TELEPHONE (OUTPATIENT)
Dept: PRIMARY CARE CLINIC | Age: 53
End: 2023-10-16

## 2023-10-16 NOTE — TELEPHONE ENCOUNTER
Patient called and said he thinks he is having a allergic reaction to the powder Daiva Kelly PA-C prescribed. Patient said he has redness and swelling in the groin area. Patient said he wants to f/u with you but I do not see anything \"open\". Patient was offered today at 1:45 pm but he said he has people at his house doing his carpet so he's not able to make it. Please advise.

## 2023-10-16 NOTE — TELEPHONE ENCOUNTER
Stop the powder and just use the cream and take benedryl in case it is an allergic reaction. If not improving, I will see him in one of my provider fills on Thursday.

## 2023-10-19 ENCOUNTER — OFFICE VISIT (OUTPATIENT)
Dept: PRIMARY CARE CLINIC | Age: 53
End: 2023-10-19
Payer: COMMERCIAL

## 2023-10-19 VITALS
BODY MASS INDEX: 40.63 KG/M2 | SYSTOLIC BLOOD PRESSURE: 128 MMHG | DIASTOLIC BLOOD PRESSURE: 88 MMHG | WEIGHT: 283.8 LBS | HEART RATE: 90 BPM | OXYGEN SATURATION: 97 % | HEIGHT: 70 IN

## 2023-10-19 DIAGNOSIS — R21 GROIN RASH: Primary | ICD-10-CM

## 2023-10-19 DIAGNOSIS — L90.0 LICHEN SCLEROSUS ET ATROPHICUS: ICD-10-CM

## 2023-10-19 PROCEDURE — 99213 OFFICE O/P EST LOW 20 MIN: CPT | Performed by: PHYSICIAN ASSISTANT

## 2023-10-19 RX ORDER — TRIAMCINOLONE ACETONIDE 0.25 MG/G
OINTMENT TOPICAL
Qty: 30 G | Refills: 1 | Status: SHIPPED | OUTPATIENT
Start: 2023-10-19 | End: 2023-10-26

## 2023-10-30 LAB — NONINV COLON CA DNA+OCC BLD SCRN STL QL: NEGATIVE

## 2023-11-22 DIAGNOSIS — R73.01 ELEVATED FASTING GLUCOSE: Primary | ICD-10-CM

## 2023-11-22 DIAGNOSIS — Z00.00 HEALTHCARE MAINTENANCE: ICD-10-CM

## 2023-11-22 LAB
ABSOLUTE IMMATURE GRANULOCYTE: 0.03 K/UL (ref 0–0.3)
BASOPHILS ABSOLUTE: 0.07 K/UL (ref 0–0.2)
BASOPHILS RELATIVE PERCENT: 1 % (ref 0–2)
EOSINOPHILS ABSOLUTE: 0.12 K/UL (ref 0–0.44)
EOSINOPHILS RELATIVE PERCENT: 3 % (ref 1–4)
HBV SURFACE AB TITR SER: <3.5 MIU/ML
HCT VFR BLD CALC: 42.5 % (ref 40.7–50.3)
HEMOGLOBIN: 14.3 G/DL (ref 13–17)
HEPATITIS C ANTIBODY: NONREACTIVE
IMMATURE GRANULOCYTES: 1 %
LYMPHOCYTES ABSOLUTE: 1.49 K/UL (ref 1.1–3.7)
LYMPHOCYTES RELATIVE PERCENT: 31 % (ref 24–43)
MCH RBC QN AUTO: 27.8 PG (ref 25.2–33.5)
MCHC RBC AUTO-ENTMCNC: 33.6 G/DL (ref 28.4–34.8)
MCV RBC AUTO: 82.5 FL (ref 82.6–102.9)
MONOCYTES ABSOLUTE: 0.37 K/UL (ref 0.1–1.2)
MONOCYTES RELATIVE PERCENT: 8 % (ref 3–12)
NEUTROPHILS ABSOLUTE: 2.78 K/UL (ref 1.5–8.1)
NEUTROPHILS RELATIVE PERCENT: 56 % (ref 36–65)
NRBC AUTOMATED: 0 PER 100 WBC
PDW BLD-RTO: 13.9 % (ref 11.8–14.4)
PLATELET # BLD: 184 K/UL (ref 138–453)
PMV BLD AUTO: 10 FL (ref 8.1–13.5)
RBC # BLD: 5.15 M/UL (ref 4.21–5.77)
RBC # BLD: ABNORMAL 10*6/UL
WBC # BLD: 4.9 K/UL (ref 3.5–11.3)

## 2023-11-23 LAB
ALBUMIN SERPL-MCNC: 4.4 G/DL (ref 3.5–5.2)
ALBUMIN/GLOBULIN RATIO: 2 (ref 1–2.5)
ALP BLD-CCNC: 66 U/L (ref 40–129)
ALT SERPL-CCNC: 26 U/L (ref 10–50)
ANION GAP SERPL CALCULATED.3IONS-SCNC: 12 MMOL/L (ref 9–16)
AST SERPL-CCNC: 24 U/L (ref 10–50)
BILIRUB SERPL-MCNC: 0.4 MG/DL (ref 0–1.2)
BUN BLDV-MCNC: 13 MG/DL (ref 6–20)
CALCIUM SERPL-MCNC: 8.8 MG/DL (ref 8.6–10.4)
CHLORIDE BLD-SCNC: 103 MMOL/L (ref 98–107)
CHOLESTEROL, FASTING: 210 MG/DL (ref 0–199)
CHOLESTEROL/HDL RATIO: 5
CO2: 23 MMOL/L (ref 20–31)
CREAT SERPL-MCNC: 0.9 MG/DL (ref 0.7–1.2)
GFR SERPL CREATININE-BSD FRML MDRD: >60 ML/MIN/1.73M2
GLUCOSE BLD-MCNC: 138 MG/DL (ref 74–99)
HDLC SERPL-MCNC: 46 MG/DL
HIV AG/AB: NONREACTIVE
LDL CHOLESTEROL: 139 MG/DL (ref 0–100)
POTASSIUM SERPL-SCNC: 4.1 MMOL/L (ref 3.7–5.3)
SODIUM BLD-SCNC: 138 MMOL/L (ref 136–145)
TOTAL PROTEIN: 6.9 G/DL (ref 6.6–8.7)
TRIGLYCERIDE, FASTING: 121 MG/DL (ref 0–149)
VLDLC SERPL CALC-MCNC: 24 MG/DL

## 2023-11-27 DIAGNOSIS — R73.01 ELEVATED FASTING GLUCOSE: Primary | ICD-10-CM

## 2023-11-27 NOTE — RESULT ENCOUNTER NOTE
Call and advise patient that the results showed high fasting glucose. Patient should complete a1c to make sure no diabetes. I have placed order. Border line cholesterol okay to increase diet and exercise to help with this.